# Patient Record
Sex: FEMALE | Race: WHITE | Employment: UNEMPLOYED | URBAN - METROPOLITAN AREA
[De-identification: names, ages, dates, MRNs, and addresses within clinical notes are randomized per-mention and may not be internally consistent; named-entity substitution may affect disease eponyms.]

---
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
## 2022-12-02 ENCOUNTER — HOSPITAL ENCOUNTER (INPATIENT)
Age: 22
LOS: 1 days | Discharge: HOME | End: 2022-12-03
Attending: OBSTETRICS & GYNECOLOGY | Admitting: OBSTETRICS & GYNECOLOGY
Payer: COMMERCIAL

## 2022-12-02 DIAGNOSIS — R11.10 HYPEREMESIS: Primary | ICD-10-CM

## 2022-12-02 LAB
ALBUMIN SERPL-MCNC: 3.4 G/DL (ref 3.5–5)
ALBUMIN/GLOB SERPL: 1 (ref 1.1–2.2)
ALP SERPL-CCNC: 80 U/L (ref 45–117)
ALT SERPL-CCNC: 24 U/L (ref 12–78)
ANION GAP SERPL CALC-SCNC: 11 MMOL/L (ref 5–15)
APPEARANCE UR: ABNORMAL
AST SERPL-CCNC: 16 U/L (ref 15–37)
BACTERIA URNS QL MICRO: ABNORMAL /HPF
BILIRUB SERPL-MCNC: 0.5 MG/DL (ref 0.2–1)
BILIRUB UR QL: NEGATIVE
BUN SERPL-MCNC: 7 MG/DL (ref 6–20)
BUN/CREAT SERPL: 13 (ref 12–20)
CALCIUM SERPL-MCNC: 9 MG/DL (ref 8.5–10.1)
CHLORIDE SERPL-SCNC: 105 MMOL/L (ref 97–108)
CO2 SERPL-SCNC: 22 MMOL/L (ref 21–32)
COLOR UR: ABNORMAL
CREAT SERPL-MCNC: 0.53 MG/DL (ref 0.55–1.02)
EPITH CASTS URNS QL MICRO: ABNORMAL /LPF
ERYTHROCYTE [DISTWIDTH] IN BLOOD BY AUTOMATED COUNT: 12.2 % (ref 11.5–14.5)
GLOBULIN SER CALC-MCNC: 3.5 G/DL (ref 2–4)
GLUCOSE SERPL-MCNC: 77 MG/DL (ref 65–100)
GLUCOSE UR STRIP.AUTO-MCNC: NEGATIVE MG/DL
HCT VFR BLD AUTO: 42.2 % (ref 35–47)
HGB BLD-MCNC: 14.3 G/DL (ref 11.5–16)
HGB UR QL STRIP: NEGATIVE
KETONES UR QL STRIP.AUTO: 80 MG/DL
LEUKOCYTE ESTERASE UR QL STRIP.AUTO: ABNORMAL
MCH RBC QN AUTO: 31.1 PG (ref 26–34)
MCHC RBC AUTO-ENTMCNC: 33.9 G/DL (ref 30–36.5)
MCV RBC AUTO: 91.7 FL (ref 80–99)
NITRITE UR QL STRIP.AUTO: NEGATIVE
NRBC # BLD: 0 K/UL (ref 0–0.01)
NRBC BLD-RTO: 0 PER 100 WBC
PH UR STRIP: 5.5 (ref 5–8)
PLATELET # BLD AUTO: 213 K/UL (ref 150–400)
PMV BLD AUTO: 9.2 FL (ref 8.9–12.9)
POTASSIUM SERPL-SCNC: 3.8 MMOL/L (ref 3.5–5.1)
PROT SERPL-MCNC: 6.9 G/DL (ref 6.4–8.2)
PROT UR STRIP-MCNC: ABNORMAL MG/DL
RBC # BLD AUTO: 4.6 M/UL (ref 3.8–5.2)
RBC #/AREA URNS HPF: ABNORMAL /HPF (ref 0–5)
SODIUM SERPL-SCNC: 138 MMOL/L (ref 136–145)
SP GR UR REFRACTOMETRY: 1.03
UA: UC IF INDICATED,UAUC: ABNORMAL
UROBILINOGEN UR QL STRIP.AUTO: 1 EU/DL (ref 0.2–1)
WBC # BLD AUTO: 9.8 K/UL (ref 3.6–11)
WBC URNS QL MICRO: ABNORMAL /HPF (ref 0–4)
YEAST URNS QL MICRO: PRESENT

## 2022-12-02 PROCEDURE — G0378 HOSPITAL OBSERVATION PER HR: HCPCS

## 2022-12-02 PROCEDURE — 96375 TX/PRO/DX INJ NEW DRUG ADDON: CPT

## 2022-12-02 PROCEDURE — 74011000250 HC RX REV CODE- 250: Performed by: OBSTETRICS & GYNECOLOGY

## 2022-12-02 PROCEDURE — 85027 COMPLETE CBC AUTOMATED: CPT

## 2022-12-02 PROCEDURE — 74011000258 HC RX REV CODE- 258: Performed by: OBSTETRICS & GYNECOLOGY

## 2022-12-02 PROCEDURE — 96365 THER/PROPH/DIAG IV INF INIT: CPT

## 2022-12-02 PROCEDURE — 80053 COMPREHEN METABOLIC PANEL: CPT

## 2022-12-02 PROCEDURE — 74011250636 HC RX REV CODE- 250/636: Performed by: OBSTETRICS & GYNECOLOGY

## 2022-12-02 PROCEDURE — 81001 URINALYSIS AUTO W/SCOPE: CPT

## 2022-12-02 PROCEDURE — 36415 COLL VENOUS BLD VENIPUNCTURE: CPT

## 2022-12-02 PROCEDURE — 96366 THER/PROPH/DIAG IV INF ADDON: CPT

## 2022-12-02 PROCEDURE — 65410000002 HC RM PRIVATE OB

## 2022-12-02 PROCEDURE — 87086 URINE CULTURE/COLONY COUNT: CPT

## 2022-12-02 RX ORDER — SODIUM CHLORIDE 0.9 % (FLUSH) 0.9 %
5-40 SYRINGE (ML) INJECTION AS NEEDED
Status: DISCONTINUED | OUTPATIENT
Start: 2022-12-02 | End: 2022-12-03 | Stop reason: HOSPADM

## 2022-12-02 RX ORDER — DOCUSATE SODIUM 100 MG/1
100 CAPSULE, LIQUID FILLED ORAL
Status: DISCONTINUED | OUTPATIENT
Start: 2022-12-02 | End: 2022-12-03 | Stop reason: HOSPADM

## 2022-12-02 RX ORDER — SODIUM CHLORIDE, SODIUM LACTATE, POTASSIUM CHLORIDE, CALCIUM CHLORIDE 600; 310; 30; 20 MG/100ML; MG/100ML; MG/100ML; MG/100ML
125 INJECTION, SOLUTION INTRAVENOUS CONTINUOUS
Status: DISCONTINUED | OUTPATIENT
Start: 2022-12-02 | End: 2022-12-03 | Stop reason: HOSPADM

## 2022-12-02 RX ORDER — FOLIC ACID/MULTIVIT,IRON,MINER 0.4MG-18MG
1 TABLET ORAL DAILY
Status: DISCONTINUED | OUTPATIENT
Start: 2022-12-03 | End: 2022-12-03 | Stop reason: HOSPADM

## 2022-12-02 RX ORDER — DIPHENHYDRAMINE HCL 25 MG
25 CAPSULE ORAL
Status: DISCONTINUED | OUTPATIENT
Start: 2022-12-02 | End: 2022-12-03 | Stop reason: HOSPADM

## 2022-12-02 RX ORDER — LEVOTHYROXINE SODIUM 100 UG/1
TABLET ORAL
COMMUNITY

## 2022-12-02 RX ORDER — ACETAMINOPHEN 325 MG/1
650 TABLET ORAL
Status: DISCONTINUED | OUTPATIENT
Start: 2022-12-02 | End: 2022-12-03 | Stop reason: HOSPADM

## 2022-12-02 RX ORDER — SODIUM CHLORIDE 0.9 % (FLUSH) 0.9 %
5-40 SYRINGE (ML) INJECTION EVERY 8 HOURS
Status: DISCONTINUED | OUTPATIENT
Start: 2022-12-02 | End: 2022-12-03 | Stop reason: HOSPADM

## 2022-12-02 RX ORDER — FAMOTIDINE 20 MG/1
20 TABLET, FILM COATED ORAL DAILY
Status: DISCONTINUED | OUTPATIENT
Start: 2022-12-03 | End: 2022-12-03 | Stop reason: HOSPADM

## 2022-12-02 RX ORDER — ZOLPIDEM TARTRATE 5 MG/1
5 TABLET ORAL
Status: DISCONTINUED | OUTPATIENT
Start: 2022-12-02 | End: 2022-12-03 | Stop reason: HOSPADM

## 2022-12-02 RX ORDER — ONDANSETRON 2 MG/ML
4 INJECTION INTRAMUSCULAR; INTRAVENOUS
Status: DISCONTINUED | OUTPATIENT
Start: 2022-12-02 | End: 2022-12-03 | Stop reason: HOSPADM

## 2022-12-02 RX ADMIN — ONDANSETRON 4 MG: 2 INJECTION INTRAMUSCULAR; INTRAVENOUS at 18:05

## 2022-12-02 RX ADMIN — THIAMINE HYDROCHLORIDE: 100 INJECTION, SOLUTION INTRAMUSCULAR; INTRAVENOUS at 18:19

## 2022-12-02 RX ADMIN — PROMETHAZINE HYDROCHLORIDE 25 MG: 25 INJECTION INTRAMUSCULAR; INTRAVENOUS at 18:20

## 2022-12-02 NOTE — PROGRESS NOTES
Problem: Nausea/Vomiting (Adult)  Goal: *Absence of nausea/vomiting  Outcome: Progressing Towards Goal  Goal: *Palliation of nausea/vomiting (Palliative Care)  Outcome: Progressing Towards Goal     Problem: Patient Education: Go to Patient Education Activity  Goal: Patient/Family Education  Outcome: Progressing Towards Goal

## 2022-12-02 NOTE — H&P
History & Physical    Name: Irais Hood MRN: [id number]  SSN: [id number]    YOB: 2000  Age: 20 y.o. Sex: female      Subjective:     Reason for Admission:  Pregnancy and Hyperemesis Gravidarum    History of Present Illness: Ms. Jina Goodrich is a 20 y.o.  female with an estimated gestational age of 11w0d with Estimated Date of Delivery: 23. Patient complains of moderate nausea/vomiting for 2 weeks. Pregnancy has been complicated by hyperemesis gravidarum. Pt has lost 6 lbs in the past 2 weeks per ob chart. Pt was seen in ED yesterday and discharged with antiemetics which the patient reports are not working. Pt reports 10 episodes of emesis today; she has dry heaves presently. Patient denies abdominal pain  , chest pain, fever, headache , pelvic pressure, right upper quadrant pain  , shortness of breath, swelling, vaginal bleeding , vaginal leaking of fluid , and visual disturbances. Pt does report a runny nose; no other URI symptoms. OB History    Para Term  AB Living   3 1 1   1     SAB IAB Ectopic Molar Multiple Live Births     1              # Outcome Date GA Lbr Jesus/2nd Weight Sex Delivery Anes PTL Lv   3 Current            2 Term 20    F Vag-Spont      1 IAB              Past Medical History:   Diagnosis Date    Abnormal Papanicolaou smear of cervix     Other ill-defined conditions(799.89)     Pt has had several episodes of postural hypotention    Thyroid activity decreased      Past Surgical History:   Procedure Laterality Date    HX OTHER SURGICAL       Social History     Occupational History    Not on file   Tobacco Use    Smoking status: Never    Smokeless tobacco: Never   Vaping Use    Vaping Use: Never used   Substance and Sexual Activity    Alcohol use: No    Drug use: No    Sexual activity: Never      No family history on file. No Known Allergies  Prior to Admission medications    Medication Sig Start Date End Date Taking?  Authorizing Provider levothyroxine (SYNTHROID) 100 mcg tablet Take  by mouth Daily (before breakfast). Yes Provider, Historical   PNV NU.43/SSLSBDS fum/folic ac (PRENATAL PO) Take  by mouth. Yes Provider, Historical   acetaminophen (TYLENOL) 325 mg tablet Take 2 Tabs by mouth every four (4) hours as needed for Pain. Patient not taking: Reported on 12/2/2022 1/12/18   Tameka Bryan PA-C   ondansetron [facility] ODT) 4 mg disintegrating tablet Take 1 Tab by mouth every eight (8) hours as needed for Nausea. Patient not taking: Reported on 12/2/2022 1/12/18   Tameka Bryan PA-C   butalbital-acetaminophen-caffeine [facility]) -40 mg per tablet Take 1-2 Tabs by mouth every four (4) hours as needed for Pain. Max Daily Amount: 12 Tabs. Patient not taking: Reported on 12/2/2022 11/16/15   Alesia Orona DO   drospirenone-ethinyl estradioL (KATE) 3-0.02 mg tab Take 1 Tab by mouth daily. Patient not taking: Reported on 12/2/2022    Other, MD Tate        Review of Systems:  A comprehensive review of systems was negative except for that written in the History of Present Illness. Objective:     Vitals:    Vitals:    12/02/22 1723   Weight: 97.1 kg (214 lb)   Height: 5' 5\" (1.651 m)      No data recorded. No data recorded     Physical Exam:  GEN: Alert Ox3; in no acute distress  CV:  NSR  ABD: soft nontender     Membranes:  Intact  Ext: no edema    Us performed in office today. Us confirms dates. Cardiac activity at 164 bpm.      Lab/Data Review:  No results found for this or any previous visit (from the past 24 hour(s)). Assessment and Plan: Active Problems:    * No active hospital problems.  *     - Hyperemesis Gravidarum:  Antiemetic Therapy including Phenergan/Zofran/Reglan/Zantac  Aggressive intravenous hydration  Check urine for ketones until trace results  Dietary consult  Strict Input and Output and Daily weights   IVF  Labs  IV antiemetics  Will see improved and able to restart a po regimen otherwise may consider zofran pump  All questions answered

## 2022-12-02 NOTE — PROGRESS NOTES
171:  Won Nicholas is a 20 y.o.   at 10w0d patient of Dr Josesito Weller at [facility] who presents to antepartum with c/o nausea vomiting. She denies vaginal bleeding, LOF, and contractions. She also denies . Urine sample obtained. : Bedside shift change report given to ESTRELLITA Herman RN (oncoming nurse) by KRYSTYNA Fish RN (offgoing nurse). Report included the following information SBAR and Kardex.

## 2022-12-03 VITALS
OXYGEN SATURATION: 95 % | TEMPERATURE: 98.5 F | WEIGHT: 214 LBS | RESPIRATION RATE: 16 BRPM | HEART RATE: 74 BPM | SYSTOLIC BLOOD PRESSURE: 93 MMHG | BODY MASS INDEX: 35.65 KG/M2 | HEIGHT: 65 IN | DIASTOLIC BLOOD PRESSURE: 51 MMHG

## 2022-12-03 LAB
BACTERIA SPEC CULT: NORMAL
CC UR VC: NORMAL
SERVICE CMNT-IMP: NORMAL

## 2022-12-03 PROCEDURE — 74011250637 HC RX REV CODE- 250/637: Performed by: OBSTETRICS & GYNECOLOGY

## 2022-12-03 PROCEDURE — 96366 THER/PROPH/DIAG IV INF ADDON: CPT

## 2022-12-03 PROCEDURE — G0378 HOSPITAL OBSERVATION PER HR: HCPCS

## 2022-12-03 PROCEDURE — 74011250636 HC RX REV CODE- 250/636: Performed by: OBSTETRICS & GYNECOLOGY

## 2022-12-03 RX ORDER — PROMETHAZINE HYDROCHLORIDE 25 MG/1
25 TABLET ORAL
Status: DISCONTINUED | OUTPATIENT
Start: 2022-12-03 | End: 2022-12-03 | Stop reason: HOSPADM

## 2022-12-03 RX ORDER — ONDANSETRON 8 MG/1
8 TABLET, ORALLY DISINTEGRATING ORAL EVERY 6 HOURS
Qty: 30 TABLET | Refills: 1 | Status: SHIPPED | OUTPATIENT
Start: 2022-12-03

## 2022-12-03 RX ORDER — PROMETHAZINE HYDROCHLORIDE 25 MG/1
25 TABLET ORAL
Qty: 20 TABLET | Refills: 1 | Status: SHIPPED | OUTPATIENT
Start: 2022-12-03

## 2022-12-03 RX ORDER — FACIAL-BODY WIPES
10 EACH TOPICAL DAILY PRN
Status: DISCONTINUED | OUTPATIENT
Start: 2022-12-03 | End: 2022-12-03 | Stop reason: HOSPADM

## 2022-12-03 RX ORDER — ONDANSETRON 4 MG/1
8 TABLET, ORALLY DISINTEGRATING ORAL
Status: DISCONTINUED | OUTPATIENT
Start: 2022-12-03 | End: 2022-12-03

## 2022-12-03 RX ORDER — ONDANSETRON 4 MG/1
8 TABLET, ORALLY DISINTEGRATING ORAL EVERY 6 HOURS
Status: DISCONTINUED | OUTPATIENT
Start: 2022-12-03 | End: 2022-12-03 | Stop reason: HOSPADM

## 2022-12-03 RX ORDER — FACIAL-BODY WIPES
10 EACH TOPICAL
Qty: 20 SUPPOSITORY | Refills: 1 | Status: SHIPPED | OUTPATIENT
Start: 2022-12-03 | End: 2022-12-16

## 2022-12-03 RX ADMIN — SODIUM CHLORIDE, POTASSIUM CHLORIDE, SODIUM LACTATE AND CALCIUM CHLORIDE 125 ML/HR: 600; 310; 30; 20 INJECTION, SOLUTION INTRAVENOUS at 07:38

## 2022-12-03 RX ADMIN — FAMOTIDINE 20 MG: 20 TABLET, FILM COATED ORAL at 09:43

## 2022-12-03 RX ADMIN — BISACODYL 10 MG: 10 SUPPOSITORY RECTAL at 09:59

## 2022-12-03 RX ADMIN — Medication 1 TABLET: at 09:43

## 2022-12-03 RX ADMIN — ONDANSETRON 8 MG: 4 TABLET, ORALLY DISINTEGRATING ORAL at 16:28

## 2022-12-03 RX ADMIN — ONDANSETRON 8 MG: 4 TABLET, ORALLY DISINTEGRATING ORAL at 09:59

## 2022-12-03 NOTE — PROGRESS NOTES
----- Message from Gali Nguyen sent at 1/25/2017  8:21 AM CST -----  Patient needs a printout of all of his medical records for 2016.  Callback:  [phone] Please just mail them to him. The address on his record his correct.   Pt discharged home after nursing reports desire for discharge and toleration of regular diet and controlled on po medication. Follow up in one week.

## 2022-12-03 NOTE — PROGRESS NOTES
Comprehensive Nutrition Assessment    Type and Reason for Visit: Initial, Consult    Nutrition Recommendations/Plan:   Continue regular diet. RD ordered Ensure Clear po BID with meals. Please document % meals and supplements consumed in flowsheet I/O's under intake. Malnutrition Assessment:  Malnutrition Status:  Insufficient data (22 1021)      Nutrition Assessment:    12/3: Chart reviewed; med noted fir hyperemesis gravidarum. Pt's n/v has improved and diet has progressed to a regular as of this AM from clear liquids. At time of attempt to visit, pt in restroom and significant other present. Pt is taking a prenatal. Lytes and BG stable. No data found. Last Weight Metric  Weight Loss Metrics 2022 2018 2015 2015 2013 10/11/2011 2011   Today's Wt 214 lb 169 lb 8.5 oz 125 lb 7.1 oz 132 lb 4.4 oz 122 lb 9.6 oz 104 lb 15 oz 91 lb 11.4 oz   BMI 35.61 kg/m2 29.1 kg/m2 - - 21.03 kg/m2 18.59 kg/m2 -      Nutrition Related Findings:    Labs: stable; Meds: pre-, zofran Wound Type: None    Current Nutrition Intake & Therapies:        ADULT DIET Regular  ADULT ORAL NUTRITION SUPPLEMENT Breakfast, Dinner; Clear Liquid    Anthropometric Measures:  Height: 5' 5\" (165.1 cm)  Ideal Body Weight (IBW): 125 lbs (57 kg)     Current Body Wt:  97.1 kg (214 lb 1.1 oz), 171.3 % IBW. Current BMI (kg/m2): 35.6                          BMI Category: Obese class 2 (BMI 35.0-39. 9)    Estimated Daily Nutrient Needs:  Energy Requirements Based On: Formula  Weight Used for Energy Requirements: Current  Energy (kcal/day): 2200 (BMR x 1. 3AF)  Weight Used for Protein Requirements: Current  Protein (g/day): 97 (1.0 g/kg bw)  Method Used for Fluid Requirements: 1 ml/kcal  Fluid (ml/day): 2200 ml/day    Nutrition Diagnosis:   Inadequate protein-energy intake related to  (hyperemesis) as evidenced by  (recent weight loss and poor po)    Nutrition Interventions:   Food and/or Nutrient Delivery: Continue current diet, Start oral nutrition supplement  Nutrition Education/Counseling: No recommendations at this time  Coordination of Nutrition Care: Continue to monitor while inpatient       Goals:     Goals: PO intake 50% or greater, by next RD assessment       Nutrition Monitoring and Evaluation:   Behavioral-Environmental Outcomes: None identified  Food/Nutrient Intake Outcomes: Food and nutrient intake, Supplement intake  Physical Signs/Symptoms Outcomes: Biochemical data, Weight, Skin    Discharge Planning:    Continue current diet    Cuca Arguello, RD  Contact:

## 2022-12-03 NOTE — DISCHARGE SUMMARY
Antepartum Discharge Summary     Name: Wali Bergeron MRN: [id number]  SSN: [id number]    YOB: 2000  Age: 20 y.o. Sex: female      Allergies: Patient has no known allergies. Admit Date: 12/2/2022    Discharge Date: 12/3/2022     Admitting Physician: Valdez Arthur MD     Attending Physician:  Nancy Bentley MD     * Admission Diagnoses: Hyperemesis affecting pregnancy, antepartum [O21.0]    * Discharge Diagnoses:  No results found for: [address], RUBELLAEXT, GRBSEXT, ABORHEXT, RUBELLAEXT, GRBSEXT   There is no immunization history on file for this patient. * Discharge Condition: improved    * Procedures:  none  * No surgery found [facility] Course:    - Hyperemesis Gravidarum:                              - Patient admitted with nausea; improved past aggressive hydration and antiemetic therapy. - Continue medications listed below. [facility] diet. * Disposition: Home    Discharge Medications:   Current Discharge Medication List        START taking these medications    Details   promethazine (PHENERGAN) 25 mg tablet Take 1 Tablet by mouth every six (6) hours as needed for Nausea (nausea). Qty: 20 Tablet, Refills: 1  Start date: 12/3/2022    Associated Diagnoses: Hyperemesis      bisacodyL (DULCOLAX) 10 mg supp Insert 10 mg into rectum daily as needed for Constipation. Qty: 20 Suppository, Refills: 1  Start date: 12/3/2022    Associated Diagnoses: Hyperemesis           CONTINUE these medications which have CHANGED    Details   ondansetron (ZOFRAN ODT) 8 mg disintegrating tablet Take 1 Tablet by mouth every six (6) hours. Indications: excessive vomiting in pregnancy  Qty: 30 Tablet, Refills: 1  Start date: 12/3/2022    Associated Diagnoses: Hyperemesis           CONTINUE these medications which have NOT CHANGED    Details   levothyroxine (SYNTHROID) 100 mcg tablet Take  by mouth Daily (before breakfast). PNV CS.05/EKLBEMG fum/folic ac (PRENATAL PO) Take  by mouth. butalbital-acetaminophen-caffeine (FIORICET) -40 mg per tablet Take 1-2 Tabs by mouth every four (4) hours as needed for Pain. Max Daily Amount: 12 Tabs. Qty: 20 Tab, Refills: 0      drospirenone-ethinyl estradioL (KATE) 3-0.02 mg tab Take 1 Tab by mouth daily. * Follow-up Care/Patient Instructions:   Activity: Activity as tolerated  Diet: Regular diet (bland)  Wound Care: None needed    Follow-up Information       Follow up With Specialties Details Why Contact Info    None    None (395) Patient stated that they have no PCP      [address]  Follow up in 1 week(s) or, As needed, If symptoms worsen [place]  [phone]

## 2022-12-03 NOTE — ROUTINE PROCESS
1430 patient feeling better, would like to go home today if possible, tolerating PO, had bowel movement within 30 minutes of ducolax, will plan to give next dose of zofran at 1600 and monitor    1530 call placed to Dr. Jack Green to update on patient status, will place discharge orders in computer    1630 Patient discharge prescriptions & instructions given. Verbalized understanding. All questions answered. No distress noted. Signed copy of discharge instructions on paper chart.

## 2022-12-03 NOTE — PROGRESS NOTES
1915 Bedside shift change report given to ESTRELLITA Varghese RN (oncoming nurse) by KRYSTYNA Olvera RN (offgoing nurse). Report included the following information SBAR and Kardex. 3275 Bedside shift change report given to YAMILETH Ambrocio RN (oncoming nurse) by ESTRELLITA Varghese RN (offgoing nurse). Report included the following information SBAR and Kardex.

## 2022-12-03 NOTE — PROGRESS NOTES
Ante Partum High Risk Pregnancy Note    Patient admitted for hyperemesis gravidarum states she does not  have  headache , abdominal pain  , contractions, right upper quadrant pain  , vaginal bleeding , swelling, and vaginal leaking of fluid . LOS:  1    Vitals: Temp (24hrs), Av.3 °F (36.8 °C), Min:98 °F (36.7 °C), Max:98.5 °F (36.9 °C)   Patient Vitals for the past 24 hrs:   BP   22 0735 (!) 93/51   22 0013 (!) 97/54   22 1949 106/67   22 1728 129/77       I&O:   No intake/output data recorded.  1901 -  0700  In: 1014.6 [I.V.:1014.6]  Out: 300 [Urine:300]    Exam:  Patient without distress.                Abdomen: soft, non-tender               Fundus: soft and non tender                           Right Upper Quadrant: non-tender               Perineum: No sign of blood or amniotic fluid               Lower Extremities: No               Patellar Reflexes: absent               Clonus: absent                        Lab/Data Review:  BMP:   Lab Results   Component Value Date/Time     2022 06:08 PM    K 3.8 2022 06:08 PM     2022 06:08 PM    CO2 22 2022 06:08 PM    AGAP 11 2022 06:08 PM    GLU 77 2022 06:08 PM    BUN 7 2022 06:08 PM    CREA 0.53 (L) 2022 06:08 PM     CMP:   Lab Results   Component Value Date/Time     2022 06:08 PM    K 3.8 2022 06:08 PM     2022 06:08 PM    CO2 22 2022 06:08 PM    AGAP 11 2022 06:08 PM    GLU 77 2022 06:08 PM    BUN 7 2022 06:08 PM    CREA 0.53 (L) 2022 06:08 PM    CA 9.0 2022 06:08 PM    ALB 3.4 (L) 2022 06:08 PM    TP 6.9 2022 06:08 PM    GLOB 3.5 2022 06:08 PM    AGRAT 1.0 (L) 2022 06:08 PM    ALT 24 2022 06:08 PM     CBC:   Lab Results   Component Value Date/Time    WBC 9.8 2022 06:08 PM    HGB 14.3 2022 06:08 PM    HCT 42.2 2022 06:08 PM     2022 06:08 PM       Assessment and Plan: Active Problems:    * No active hospital problems. *        Hyperemesis Gravidarum:  Antiemetic Therapy including Phenergan/Zofran/Reglan/Zantac  Aggressive intravenous hydration  Check urine for ketones until trace results  Dietary consult  Strict Input and Output and Daily weights     Pt reports zero episodes of emesis since admission  She reports feels nauseated this morning; level of nausea presently 1/10  Will attempt to advance diet as improved  Will attempt to convert to a po regimen.   All questions answered

## 2022-12-07 ENCOUNTER — HOSPITAL ENCOUNTER (EMERGENCY)
Age: 22
Discharge: HOME | End: 2022-12-07
Attending: MIDWIFE | Admitting: MIDWIFE
Payer: COMMERCIAL

## 2022-12-07 VITALS
HEART RATE: 92 BPM | RESPIRATION RATE: 18 BRPM | OXYGEN SATURATION: 96 % | TEMPERATURE: 98.4 F | SYSTOLIC BLOOD PRESSURE: 112 MMHG | DIASTOLIC BLOOD PRESSURE: 74 MMHG | HEIGHT: 65 IN | BODY MASS INDEX: 35.16 KG/M2 | WEIGHT: 211 LBS

## 2022-12-07 LAB
ALBUMIN SERPL-MCNC: 3.5 G/DL (ref 3.5–5)
ALBUMIN/GLOB SERPL: 0.9 {RATIO} (ref 1.1–2.2)
ALP SERPL-CCNC: 83 U/L (ref 45–117)
ALT SERPL-CCNC: 28 U/L (ref 12–78)
ANION GAP SERPL CALC-SCNC: 11 MMOL/L (ref 5–15)
AST SERPL-CCNC: 13 U/L (ref 15–37)
BASOPHILS # BLD: 0 K/UL (ref 0–0.1)
BASOPHILS NFR BLD: 0 % (ref 0–1)
BILIRUB SERPL-MCNC: 0.5 MG/DL (ref 0.2–1)
BUN SERPL-MCNC: 8 MG/DL (ref 6–20)
BUN/CREAT SERPL: 13 (ref 12–20)
CALCIUM SERPL-MCNC: 9.2 MG/DL (ref 8.5–10.1)
CHLORIDE SERPL-SCNC: 106 MMOL/L (ref 97–108)
CO2 SERPL-SCNC: 21 MMOL/L (ref 21–32)
CREAT SERPL-MCNC: 0.63 MG/DL (ref 0.55–1.02)
DIFFERENTIAL METHOD BLD: ABNORMAL
EOSINOPHIL # BLD: 0.1 K/UL (ref 0–0.4)
EOSINOPHIL NFR BLD: 1 % (ref 0–7)
ERYTHROCYTE [DISTWIDTH] IN BLOOD BY AUTOMATED COUNT: 12 % (ref 11.5–14.5)
GLOBULIN SER CALC-MCNC: 3.7 G/DL (ref 2–4)
GLUCOSE SERPL-MCNC: 146 MG/DL (ref 65–100)
HCT VFR BLD AUTO: 41.1 % (ref 35–47)
HGB BLD-MCNC: 14.3 G/DL (ref 11.5–16)
IMM GRANULOCYTES # BLD AUTO: 0.1 K/UL (ref 0–0.04)
IMM GRANULOCYTES NFR BLD AUTO: 1 % (ref 0–0.5)
LYMPHOCYTES # BLD: 2.2 K/UL (ref 0.8–3.5)
LYMPHOCYTES NFR BLD: 19 % (ref 12–49)
MCH RBC QN AUTO: 31.2 PG (ref 26–34)
MCHC RBC AUTO-ENTMCNC: 34.8 G/DL (ref 30–36.5)
MCV RBC AUTO: 89.5 FL (ref 80–99)
MONOCYTES # BLD: 0.7 K/UL (ref 0–1)
MONOCYTES NFR BLD: 6 % (ref 5–13)
NEUTS SEG # BLD: 8.9 K/UL (ref 1.8–8)
NEUTS SEG NFR BLD: 73 % (ref 32–75)
NRBC # BLD: 0 K/UL (ref 0–0.01)
NRBC BLD-RTO: 0 PER 100 WBC
PLATELET # BLD AUTO: 295 K/UL (ref 150–400)
PMV BLD AUTO: 9 FL (ref 8.9–12.9)
POTASSIUM SERPL-SCNC: 3.4 MMOL/L (ref 3.5–5.1)
PROT SERPL-MCNC: 7.2 G/DL (ref 6.4–8.2)
RBC # BLD AUTO: 4.59 M/UL (ref 3.8–5.2)
SODIUM SERPL-SCNC: 138 MMOL/L (ref 136–145)
WBC # BLD AUTO: 12 K/UL (ref 3.6–11)

## 2022-12-07 PROCEDURE — 85025 COMPLETE CBC W/AUTO DIFF WBC: CPT

## 2022-12-07 PROCEDURE — 74011250636 HC RX REV CODE- 250/636: Performed by: OBSTETRICS & GYNECOLOGY

## 2022-12-07 PROCEDURE — 74011000258 HC RX REV CODE- 258: Performed by: OBSTETRICS & GYNECOLOGY

## 2022-12-07 PROCEDURE — 36415 COLL VENOUS BLD VENIPUNCTURE: CPT

## 2022-12-07 PROCEDURE — 74011250637 HC RX REV CODE- 250/637: Performed by: MIDWIFE

## 2022-12-07 PROCEDURE — 80053 COMPREHEN METABOLIC PANEL: CPT

## 2022-12-07 RX ORDER — ONDANSETRON 2 MG/ML
4 INJECTION INTRAMUSCULAR; INTRAVENOUS
Status: DISCONTINUED | OUTPATIENT
Start: 2022-12-07 | End: 2022-12-07 | Stop reason: HOSPADM

## 2022-12-07 RX ORDER — SCOLOPAMINE TRANSDERMAL SYSTEM 1 MG/1
1 PATCH, EXTENDED RELEASE TRANSDERMAL
Qty: 5 PATCH | Refills: 0 | Status: SHIPPED | OUTPATIENT
Start: 2022-12-07

## 2022-12-07 RX ORDER — SCOLOPAMINE TRANSDERMAL SYSTEM 1 MG/1
1 PATCH, EXTENDED RELEASE TRANSDERMAL
Status: DISCONTINUED | OUTPATIENT
Start: 2022-12-07 | End: 2022-12-07 | Stop reason: HOSPADM

## 2022-12-07 RX ORDER — DEXTROSE, SODIUM CHLORIDE, SODIUM LACTATE, POTASSIUM CHLORIDE, AND CALCIUM CHLORIDE 5; .6; .31; .03; .02 G/100ML; G/100ML; G/100ML; G/100ML; G/100ML
1000 INJECTION, SOLUTION INTRAVENOUS ONCE
Status: COMPLETED | OUTPATIENT
Start: 2022-12-07 | End: 2022-12-07

## 2022-12-07 RX ADMIN — SODIUM CHLORIDE, SODIUM LACTATE, POTASSIUM CHLORIDE, CALCIUM CHLORIDE AND DEXTROSE MONOHYDRATE 1000 ML: 5; 600; 310; 30; 20 INJECTION, SOLUTION INTRAVENOUS at 18:20

## 2022-12-07 RX ADMIN — ONDANSETRON 4 MG: 2 INJECTION INTRAMUSCULAR; INTRAVENOUS at 18:20

## 2022-12-07 RX ADMIN — PROMETHAZINE HYDROCHLORIDE 25 MG: 25 INJECTION INTRAMUSCULAR; INTRAVENOUS at 18:51

## 2022-12-07 NOTE — PROGRESS NOTES
175: Shanda Mendez is a 20 y.o.   at 10w5d patient of Dr Abdulkadir Bro at [facility] who presents to L&D triage with c/o nausea and vomiting. She reports Positive FM, denies vaginal bleeding, LOF, and contractions. She also denies Headaches, Scotoma, RUQ pain, and Edema. Verbal orders received from ESTRELLITA Kulkarni for IV meds, fluids and lab work to be completed. : Bedside shift change report given to [facility] (oncoming nurse) by ALTON Green RN (offgoing nurse). Report included the following information SBAR, Kardex, and MAR.

## 2022-12-08 ENCOUNTER — HOSPITAL ENCOUNTER (OUTPATIENT)
Age: 22
Discharge: HOME | End: 2022-12-08
Attending: MIDWIFE | Admitting: MIDWIFE
Payer: COMMERCIAL

## 2022-12-08 VITALS
TEMPERATURE: 98.8 F | OXYGEN SATURATION: 98 % | WEIGHT: 211 LBS | DIASTOLIC BLOOD PRESSURE: 60 MMHG | HEIGHT: 65 IN | BODY MASS INDEX: 35.16 KG/M2 | HEART RATE: 78 BPM | SYSTOLIC BLOOD PRESSURE: 109 MMHG | RESPIRATION RATE: 16 BRPM

## 2022-12-08 PROBLEM — O21.9 NAUSEA AND VOMITING IN PREGNANCY: Status: ACTIVE | Noted: 2022-12-08

## 2022-12-08 LAB
ALBUMIN SERPL-MCNC: 3.4 G/DL (ref 3.5–5)
ALBUMIN/GLOB SERPL: 0.9 {RATIO} (ref 1.1–2.2)
ALP SERPL-CCNC: 77 U/L (ref 45–117)
ALT SERPL-CCNC: 23 U/L (ref 12–78)
ANION GAP SERPL CALC-SCNC: 5 MMOL/L (ref 5–15)
APPEARANCE UR: ABNORMAL
AST SERPL-CCNC: 8 U/L (ref 15–37)
BACTERIA URNS QL MICRO: ABNORMAL /HPF
BASOPHILS # BLD: 0 K/UL (ref 0–0.1)
BASOPHILS NFR BLD: 0 % (ref 0–1)
BILIRUB SERPL-MCNC: 0.8 MG/DL (ref 0.2–1)
BILIRUB UR QL CFM: NEGATIVE
BUN SERPL-MCNC: 6 MG/DL (ref 6–20)
BUN/CREAT SERPL: 9 (ref 12–20)
CALCIUM SERPL-MCNC: 9.1 MG/DL (ref 8.5–10.1)
CHLORIDE SERPL-SCNC: 109 MMOL/L (ref 97–108)
CO2 SERPL-SCNC: 25 MMOL/L (ref 21–32)
COLOR UR: ABNORMAL
CREAT SERPL-MCNC: 0.64 MG/DL (ref 0.55–1.02)
DIFFERENTIAL METHOD BLD: ABNORMAL
EOSINOPHIL # BLD: 0.1 K/UL (ref 0–0.4)
EOSINOPHIL NFR BLD: 1 % (ref 0–7)
EPITH CASTS URNS QL MICRO: ABNORMAL /LPF
ERYTHROCYTE [DISTWIDTH] IN BLOOD BY AUTOMATED COUNT: 12.2 % (ref 11.5–14.5)
GLOBULIN SER CALC-MCNC: 3.6 G/DL (ref 2–4)
GLUCOSE SERPL-MCNC: 107 MG/DL (ref 65–100)
GLUCOSE UR STRIP.AUTO-MCNC: NEGATIVE MG/DL
HCT VFR BLD AUTO: 39.4 % (ref 35–47)
HGB BLD-MCNC: 13.5 G/DL (ref 11.5–16)
HGB UR QL STRIP: ABNORMAL
IMM GRANULOCYTES # BLD AUTO: 0.1 K/UL (ref 0–0.04)
IMM GRANULOCYTES NFR BLD AUTO: 0 % (ref 0–0.5)
KETONES UR QL STRIP.AUTO: 15 MG/DL
LEUKOCYTE ESTERASE UR QL STRIP.AUTO: ABNORMAL
LYMPHOCYTES # BLD: 2.7 K/UL (ref 0.8–3.5)
LYMPHOCYTES NFR BLD: 23 % (ref 12–49)
MCH RBC QN AUTO: 31.1 PG (ref 26–34)
MCHC RBC AUTO-ENTMCNC: 34.3 G/DL (ref 30–36.5)
MCV RBC AUTO: 90.8 FL (ref 80–99)
MONOCYTES # BLD: 0.8 K/UL (ref 0–1)
MONOCYTES NFR BLD: 7 % (ref 5–13)
MUCOUS THREADS URNS QL MICRO: ABNORMAL /LPF
NEUTS SEG # BLD: 8.2 K/UL (ref 1.8–8)
NEUTS SEG NFR BLD: 69 % (ref 32–75)
NITRITE UR QL STRIP.AUTO: NEGATIVE
NRBC # BLD: 0 K/UL (ref 0–0.01)
NRBC BLD-RTO: 0 PER 100 WBC
PH UR STRIP: 5.5 [PH] (ref 5–8)
PLATELET # BLD AUTO: 283 K/UL (ref 150–400)
PMV BLD AUTO: 9 FL (ref 8.9–12.9)
POTASSIUM SERPL-SCNC: 3.5 MMOL/L (ref 3.5–5.1)
PROT SERPL-MCNC: 7 G/DL (ref 6.4–8.2)
PROT UR STRIP-MCNC: 30 MG/DL
RBC # BLD AUTO: 4.34 M/UL (ref 3.8–5.2)
RBC #/AREA URNS HPF: ABNORMAL /HPF (ref 0–5)
SODIUM SERPL-SCNC: 139 MMOL/L (ref 136–145)
SP GR UR REFRACTOMETRY: 1.03
UA: UC IF INDICATED,UAUC: ABNORMAL
UROBILINOGEN UR QL STRIP.AUTO: 1 EU/DL (ref 0.2–1)
WBC # BLD AUTO: 11.9 K/UL (ref 3.6–11)
WBC URNS QL MICRO: ABNORMAL /HPF (ref 0–4)

## 2022-12-08 PROCEDURE — 85025 COMPLETE CBC W/AUTO DIFF WBC: CPT

## 2022-12-08 PROCEDURE — 99282 EMERGENCY DEPT VISIT SF MDM: CPT

## 2022-12-08 PROCEDURE — 74011250636 HC RX REV CODE- 250/636: Performed by: MIDWIFE

## 2022-12-08 PROCEDURE — 87086 URINE CULTURE/COLONY COUNT: CPT

## 2022-12-08 PROCEDURE — 74011250637 HC RX REV CODE- 250/637: Performed by: MIDWIFE

## 2022-12-08 PROCEDURE — 80053 COMPREHEN METABOLIC PANEL: CPT

## 2022-12-08 PROCEDURE — 74011000250 HC RX REV CODE- 250: Performed by: MIDWIFE

## 2022-12-08 PROCEDURE — 36415 COLL VENOUS BLD VENIPUNCTURE: CPT

## 2022-12-08 PROCEDURE — 96360 HYDRATION IV INFUSION INIT: CPT

## 2022-12-08 PROCEDURE — 81001 URINALYSIS AUTO W/SCOPE: CPT

## 2022-12-08 RX ORDER — ONDANSETRON 2 MG/ML
4 INJECTION INTRAMUSCULAR; INTRAVENOUS
Status: DISCONTINUED | OUTPATIENT
Start: 2022-12-08 | End: 2022-12-08 | Stop reason: HOSPADM

## 2022-12-08 RX ORDER — GRANULES FOR ORAL 3 G/1
3 POWDER ORAL ONCE
Status: COMPLETED | OUTPATIENT
Start: 2022-12-08 | End: 2022-12-08

## 2022-12-08 RX ADMIN — THIAMINE HYDROCHLORIDE: 100 INJECTION, SOLUTION INTRAMUSCULAR; INTRAVENOUS at 16:47

## 2022-12-08 RX ADMIN — GRANULES FOR ORAL SOLUTION 3 G: 3 POWDER ORAL at 18:13

## 2022-12-08 RX ADMIN — ONDANSETRON 4 MG: 2 INJECTION INTRAMUSCULAR; INTRAVENOUS at 17:41

## 2022-12-08 NOTE — H&P
History & Physical    Name: Sotero Angelucci MRN: [id number]  SSN: [id number]    YOB: 2000  Age: 20 y.o. Sex: female      Subjective:     Reason for Admission:  Pregnancy and Hyperemesis Gravidarum    History of Present Illness: Ms. Yolanda Monroe is a 20 y.o.  female with an estimated gestational age of 7w10d with Estimated Date of Delivery: 23. Patient complains of severe nausea/vomiting for 3 days. Pregnancy has been complicated by nausea and vomiting. Patient denies abdominal pain  , chest pain, contractions, fever, headache , pelvic pressure, right upper quadrant pain  , shortness of breath, swelling, vaginal bleeding , vaginal leaking of fluid , and visual disturbances. OB History    Para Term  AB Living   3 1 1   1 1   SAB IAB Ectopic Molar Multiple Live Births     1       1      # Outcome Date GA Lbr Jesus/2nd Weight Sex Delivery Anes PTL Lv   3 Current            2 Term 20    F Vag-Spont EPI  PING   1 IAB              Past Medical History:   Diagnosis Date    Abnormal Papanicolaou smear of cervix     Other ill-defined conditions(799.89)     Pt has had several episodes of postural hypotention    POTS (postural orthostatic tachycardia syndrome)     Thyroid activity decreased      Past Surgical History:   Procedure Laterality Date    HX OTHER SURGICAL       Social History     Occupational History    Not on file   Tobacco Use    Smoking status: Never    Smokeless tobacco: Never   Vaping Use    Vaping Use: Never used   Substance and Sexual Activity    Alcohol use: No    Drug use: No    Sexual activity: Never      History reviewed. No pertinent family history. No Known Allergies  Prior to Admission medications    Medication Sig Start Date End Date Taking? Authorizing Provider   scopolamine (TRANSDERM-SCOP) 1 mg over 3 days pt3d 1 Patch by TransDERmal route every seventy-two (72) hours.  22  Yes Denis Ip, CNM   ondansetron (ZOFRAN ODT) 8 mg disintegrating tablet Take 1 Tablet by mouth every six (6) hours. Indications: excessive vomiting in pregnancy  Patient taking differently: Take 4 mg by mouth every four (4) hours. Indications: excessive vomiting in pregnancy 12/3/22  Yes Omari Moraes MD   promethazine (PHENERGAN) 25 mg tablet Take 1 Tablet by mouth every six (6) hours as needed for Nausea (nausea). 12/3/22  Yes Omari Moraes MD   bisacodyL (DULCOLAX) 10 mg supp Insert 10 mg into rectum daily as needed for Constipation. 12/3/22  Yes Omari Moraes MD   levothyroxine (SYNTHROID) 100 mcg tablet Take  by mouth Daily (before breakfast). Yes Provider, Historical   PNV TZ.78/UFZQJXK fum/folic ac (PRENATAL PO) Take  by mouth. Yes Provider, Historical   acetaminophen (TYLENOL) 325 mg tablet Take 2 Tabs by mouth every four (4) hours as needed for Pain. Patient not taking: No sig reported 18   Alberto Monet PA-C   butalbital-acetaminophen-caffeine (FIORICET) -40 mg per tablet Take 1-2 Tabs by mouth every four (4) hours as needed for Pain. Max Daily Amount: 12 Tabs. Patient not taking: Reported on 2022 11/16/15   Jewels Matias DO   drospirenone-ethinyl estradioL (KATE) 3-0.02 mg tab Take 1 Tab by mouth daily. Patient not taking: Reported on 2022    Other, MD Tate        Review of Systems:  A comprehensive review of systems was negative except for that written in the History of Present Illness.      Objective:     Vitals:    Vitals:    22 1820 22 1856   BP: 112/74    Pulse: 92    Resp: 18    Temp: 98.4 °F (36.9 °C)    SpO2: 96%    Weight:  95.7 kg (211 lb)   Height:  5' 5\" (1.651 m)      Temp (24hrs), Av.4 °F (36.9 °C), Min:98.4 °F (36.9 °C), Max:98.4 °F (36.9 °C)    BP  Min: 112/74  Max: 112/74     Physical Exam:  Deferred     Membranes:  Intact  Uterine Activity:  None  Fetal Heart Rate:  unable to determine d/t gestational age       Lab/Data Review:  Recent Results (from the past 24 hour(s))   CBC WITH AUTOMATED DIFF    Collection Time: 12/07/22  6:23 PM   Result Value Ref Range    WBC 12.0 (H) 3.6 - 11.0 K/uL    RBC 4.59 3.80 - 5.20 M/uL    HGB 14.3 11.5 - 16.0 g/dL    HCT 41.1 35.0 - 47.0 %    MCV 89.5 80.0 - 99.0 FL    MCH 31.2 26.0 - 34.0 PG    MCHC 34.8 30.0 - 36.5 g/dL    RDW 12.0 11.5 - 14.5 %    PLATELET 162 690 - 362 K/uL    MPV 9.0 8.9 - 12.9 FL    NRBC 0.0 0  WBC    ABSOLUTE NRBC 0.00 0.00 - 0.01 K/uL    NEUTROPHILS 73 32 - 75 %    LYMPHOCYTES 19 12 - 49 %    MONOCYTES 6 5 - 13 %    EOSINOPHILS 1 0 - 7 %    BASOPHILS 0 0 - 1 %    IMMATURE GRANULOCYTES 1 (H) 0.0 - 0.5 %    ABS. NEUTROPHILS 8.9 (H) 1.8 - 8.0 K/UL    ABS. LYMPHOCYTES 2.2 0.8 - 3.5 K/UL    ABS. MONOCYTES 0.7 0.0 - 1.0 K/UL    ABS. EOSINOPHILS 0.1 0.0 - 0.4 K/UL    ABS. BASOPHILS 0.0 0.0 - 0.1 K/UL    ABS. IMM. GRANS. 0.1 (H) 0.00 - 0.04 K/UL    DF AUTOMATED     METABOLIC PANEL, COMPREHENSIVE    Collection Time: 12/07/22  6:23 PM   Result Value Ref Range    Sodium 138 136 - 145 mmol/L    Potassium 3.4 (L) 3.5 - 5.1 mmol/L    Chloride 106 97 - 108 mmol/L    CO2 21 21 - 32 mmol/L    Anion gap 11 5 - 15 mmol/L    Glucose 146 (H) 65 - 100 mg/dL    BUN 8 6 - 20 MG/DL    Creatinine 0.63 0.55 - 1.02 MG/DL    BUN/Creatinine ratio 13 12 - 20      eGFR >60 >60 ml/min/1.73m2    Calcium 9.2 8.5 - 10.1 MG/DL    Bilirubin, total 0.5 0.2 - 1.0 MG/DL    ALT (SGPT) 28 12 - 78 U/L    AST (SGOT) 13 (L) 15 - 37 U/L    Alk. phosphatase 83 45 - 117 U/L    Protein, total 7.2 6.4 - 8.2 g/dL    Albumin 3.5 3.5 - 5.0 g/dL    Globulin 3.7 2.0 - 4.0 g/dL    A-G Ratio 0.9 (L) 1.1 - 2.2         Assessment and Plan: Active Problems:    * No active hospital problems. *     - Hyperemesis Gravidarum:   antiemetic therapy including zofran, phenergan and scop patch. IV hydration. May DC home w/ PO toleration.

## 2022-12-08 NOTE — H&P
History & Physical     Name: Irais Hood MRN: [id number]  SSN: [id number]    YOB: 2000  Age: 20 y.o. Sex: female       Subjective:      Reason for Admission:  Pregnancy and Hyperemesis Gravidarum     History of Present Illness: Ms. Jina Goodrich is a 20 y.o.  female with an estimated gestational age of 6w6d with Estimated Date of Delivery: 23. Patient complains of severe nausea/vomiting for 4 days. Pregnancy has been complicated by nausea and vomiting. Patient denies abdominal pain  , chest pain, contractions, fever, headache , pelvic pressure, right upper quadrant pain  , shortness of breath, swelling, vaginal bleeding , vaginal leaking of fluid , and visual disturbances. Pt was seen yesterday for iv hydration and antiemetics - discharged after feeling better. Pt seen in office today and states that she still can't keep anything down. Has scopolamine patch in place that was given yesterday. OB History    Para Term  AB Living   3 1 1   1 1   SAB IAB Ectopic Molar Multiple Live Births      1       1       # Outcome Date GA Lbr Jesus/2nd Weight Sex Delivery Anes PTL Lv   3 Current                     2 Term 20       F Vag-Spont EPI   PING   1 IAB                             Past Medical History:   Diagnosis Date    Abnormal Papanicolaou smear of cervix      Other ill-defined conditions(689.89)       Pt has had several episodes of postural hypotention    POTS (postural orthostatic tachycardia syndrome)      Thyroid activity decreased              Past Surgical History:   Procedure Laterality Date    HX OTHER SURGICAL          Social History           Occupational History    Not on file   Tobacco Use    Smoking status: Never    Smokeless tobacco: Never   Vaping Use    Vaping Use: Never used   Substance and Sexual Activity    Alcohol use: No    Drug use: No    Sexual activity: Never      History reviewed. No pertinent family history.      No Known Allergies Prior to Admission medications    Medication Sig Start Date End Date Taking? Authorizing Provider   scopolamine (TRANSDERM-SCOP) 1 mg over 3 days pt3d 1 Patch by TransDERmal route every seventy-two (72) hours. 12/7/22   Yes Bridget Alvares CNM   ondansetron (ZOFRAN ODT) 8 mg disintegrating tablet Take 1 Tablet by mouth every six (6) hours. Indications: excessive vomiting in pregnancy  Patient taking differently: Take 4 mg by mouth every four (4) hours. Indications: excessive vomiting in pregnancy 12/3/22   Yes Omari Moraes MD   promethazine (PHENERGAN) 25 mg tablet Take 1 Tablet by mouth every six (6) hours as needed for Nausea (nausea). 12/3/22   Yes Omari Moraes MD   bisacodyL (DULCOLAX) 10 mg supp Insert 10 mg into rectum daily as needed for Constipation. 12/3/22   Yes Omari Moraes MD   levothyroxine (SYNTHROID) 100 mcg tablet Take  by mouth Daily (before breakfast). Yes Provider, Historical   PNV RS.93/JLAVFNA fum/folic ac (PRENATAL PO) Take  by mouth. Yes Provider, Historical   acetaminophen (TYLENOL) 325 mg tablet Take 2 Tabs by mouth every four (4) hours as needed for Pain. Patient not taking: No sig reported 1/12/18     Andrews Miner PA-C   butalbital-acetaminophen-caffeine (FIORICET) -40 mg per tablet Take 1-2 Tabs by mouth every four (4) hours as needed for Pain. Max Daily Amount: 12 Tabs. Patient not taking: Reported on 12/7/2022 11/16/15     Aleyda De La oRsa DO   drospirenone-ethinyl estradioL (KATE) 3-0.02 mg tab Take 1 Tab by mouth daily. Patient not taking: Reported on 12/7/2022       Other, MD Tate         Review of Systems:  A comprehensive review of systems was negative except for that written in the History of Present Illness.       Objective:      Vitals:         Vitals:     12/07/22 1820 12/07/22 1856   BP: 112/74     Pulse: 92     Resp: 18     Temp: 98.4 °F (36.9 °C)     SpO2: 96%     Weight:   95.7 kg (211 lb)   Height:   5' 5\" (1.651 m)      Temp (24hrs), Av.4 °F (36.9 °C), Min:98.4 °F (36.9 °C), Max:98.4 °F (36.9 °C)     BP  Min: 112/74  Max: 112/74      Physical Exam:  Deferred     Membranes:  Intact  Uterine Activity:  None  Fetal Heart Rate:  unable to determine d/t gestational age        Lab/Data Review:  Recent Results         Recent Results (from the past 24 hour(s))   CBC WITH AUTOMATED DIFF     Collection Time: 22  6:23 PM   Result Value Ref Range     WBC 12.0 (H) 3.6 - 11.0 K/uL     RBC 4.59 3.80 - 5.20 M/uL     HGB 14.3 11.5 - 16.0 g/dL     HCT 41.1 35.0 - 47.0 %     MCV 89.5 80.0 - 99.0 FL     MCH 31.2 26.0 - 34.0 PG     MCHC 34.8 30.0 - 36.5 g/dL     RDW 12.0 11.5 - 14.5 %     PLATELET 376 079 - 549 K/uL     MPV 9.0 8.9 - 12.9 FL     NRBC 0.0 0  WBC     ABSOLUTE NRBC 0.00 0.00 - 0.01 K/uL     NEUTROPHILS 73 32 - 75 %     LYMPHOCYTES 19 12 - 49 %     MONOCYTES 6 5 - 13 %     EOSINOPHILS 1 0 - 7 %     BASOPHILS 0 0 - 1 %     IMMATURE GRANULOCYTES 1 (H) 0.0 - 0.5 %     ABS. NEUTROPHILS 8.9 (H) 1.8 - 8.0 K/UL     ABS. LYMPHOCYTES 2.2 0.8 - 3.5 K/UL     ABS. MONOCYTES 0.7 0.0 - 1.0 K/UL     ABS. EOSINOPHILS 0.1 0.0 - 0.4 K/UL     ABS. BASOPHILS 0.0 0.0 - 0.1 K/UL     ABS. IMM. GRANS. 0.1 (H) 0.00 - 0.04 K/UL     DF AUTOMATED     METABOLIC PANEL, COMPREHENSIVE     Collection Time: 22  6:23 PM   Result Value Ref Range     Sodium 138 136 - 145 mmol/L     Potassium 3.4 (L) 3.5 - 5.1 mmol/L     Chloride 106 97 - 108 mmol/L     CO2 21 21 - 32 mmol/L     Anion gap 11 5 - 15 mmol/L     Glucose 146 (H) 65 - 100 mg/dL     BUN 8 6 - 20 MG/DL     Creatinine 0.63 0.55 - 1.02 MG/DL     BUN/Creatinine ratio 13 12 - 20       eGFR >60 >60 ml/min/1.73m2     Calcium 9.2 8.5 - 10.1 MG/DL     Bilirubin, total 0.5 0.2 - 1.0 MG/DL     ALT (SGPT) 28 12 - 78 U/L     AST (SGOT) 13 (L) 15 - 37 U/L     Alk.  phosphatase 83 45 - 117 U/L     Protein, total 7.2 6.4 - 8.2 g/dL     Albumin 3.5 3.5 - 5.0 g/dL     Globulin 3.7 2.0 - 4.0 g/dL     A-G Ratio 0.9 (L) 1.1 - 2.2              Assessment and Plan: Active Problems:    * No active hospital problems. *     - Hyperemesis Gravidarum:   antiemetic therapy including zofran, phenergan and scop patch. IV hydration. May DC home w/ PO toleration.

## 2022-12-08 NOTE — PROGRESS NOTES
Report rec'd from Dilan Ruelas. Care assumed of pt. Awaiting lab results. 2020Randie Prom, CNM reviewed lab results and gives discharge order. CNM advises for pt to follow up tomorrow and Scop patch Rx sent to pharmacy of pt choice. 2030- Pt left unit wi discharge instructions. Pt to follow up in office.

## 2022-12-08 NOTE — DISCHARGE INSTRUCTIONS
Extreme Nausea and Vomiting in Pregnancy: Care Instructions  Your Care Instructions     Nausea and vomiting (often called morning sickness) are common in pregnancy. They are caused by pregnancy hormones and happen most often in the first 3 months. Some women get very sick and are not able to keep down food and fluids. This extreme morning sickness is called hyperemesis gravidarum. It can lead to a dangerous loss of fluids in the body. It also can keep you from gaining weight and getting proper nutrition during your pregnancy. Your body fluids are put back in balance with water and minerals called electrolytes. Medicine may help if you have severe nausea and vomiting. Follow-up care is a key part of your treatment and safety. Be sure to make and go to all appointments, and call your doctor if you are having problems. It's also a good idea to know your test results and keep a list of the medicines you take. How can you care for yourself at home? Take your medicines exactly as prescribed. Call your doctor if you think you are having a problem with your medicine. Drink plenty of fluids to prevent dehydration. Choose water and other clear liquids until you feel better. Try sipping on sports drinks that have salt and sugar in them. Eat a small snack, such as crackers, before you get out of bed. Wait a few minutes, then get out of bed slowly. Keep food in your stomach, but not too much at once. An empty stomach can make nausea worse. Eat several small meals every day instead of three large meals. Eat more protein and less fat. Get plenty of vitamin B6 by eating whole grains, nuts, seeds, and legumes. You can take vitamin B6 tablets if your doctor says it is okay. Try to avoid smells and foods that make you feel sick to your stomach. Get lots of rest.  You may want to try acupressure bands. They put pressure on an acupressure point in the wrist. Some women feel better using the bands.   Karen may also help you feel better. You can use it in tea, take it as a pill, or use a jimi syrup that you can buy at a health food store. When should you call for help? Call 911 anytime you think you may need emergency care. For example, call if:    You passed out (lost consciousness). Call your doctor now or seek immediate medical care if:    You are sick to your stomach or cannot drink fluids. You have symptoms of dehydration, such as:  Dry eyes and a dry mouth. Passing only a little urine. Feeling thirstier than usual.     You are not able to keep down your medicines. You have pain in your belly or pelvis. Watch closely for changes in your health, and be sure to contact your doctor if:    You do not get better as expected. Where can you learn more? Go to http://www.gray.com/  Enter M072 in the search box to learn more about \"Extreme Nausea and Vomiting in Pregnancy: Care Instructions. \"  Current as of: February 23, 2022               Content Version: 13.4  © 2006-2022 [email or web address]. Care instructions adapted under license by [employer] (which disclaims liability or warranty for this information). If you have questions about a medical condition or this instruction, always ask your healthcare professional. [place] [address] any warranty or liability for your use of this information. Learning About Pregnancy  Your Care Instructions     Your health in the early weeks of your pregnancy is particularly important for your baby's health. Take good care of yourself. Anything you do that harms your body can also harm your baby. Make sure to go to all of your doctor appointments. Regular checkups will help keep you and your baby healthy. How can you care for yourself at home? Diet    Eat a balanced diet. Make sure your diet includes plenty of beans, peas, and leafy green vegetables. Do not skip meals or go for many hours without eating.  If you are nauseated, try to eat a small, healthy snack every 2 to 3 hours. Do not eat fish that has a high level of mercury, such as shark, swordfish, or mackerel. Do not eat more than one can of tuna each week. Drink plenty of fluids. If you have kidney, heart, or liver disease and have to limit fluids, talk with your doctor before you increase the amount of fluids you drink. Cut down on caffeine, such as coffee, tea, and cola. Do not drink alcohol, such as beer, wine, or hard liquor. Take a multivitamin that contains at least 400 micrograms (mcg) of folic acid to help prevent birth defects. Fortified cereal and whole wheat bread are good additional sources of folic acid. Increase the calcium in your diet. Try to drink a quart of skim milk each day. You may also take calcium supplements and choose foods such as cheese and yogurt. Lifestyle    Make sure you go to your follow-up appointments. Get plenty of rest. You may be unusually tired while you are pregnant. Get at least 30 minutes of exercise on most days of the week. Walking is a good choice. If you have not exercised in the past, start out slowly. Take several short walks each day. Do not smoke. If you need help quitting, talk to your doctor about stop-smoking programs. These can increase your chances of quitting for good. Do not touch cat feces or litter boxes. Also, wash your hands after you handle raw meat, and fully cook all meat before you eat it. Wear gloves when you work in the yard or garden, and wash your hands well when you are done. Cat feces, raw or undercooked meat, and contaminated dirt can cause an infection that may harm your baby or lead to a miscarriage. Avoid things that can make your body too hot and may be harmful to your baby, such as a hot tub or sauna. Or talk with your doctor before doing anything that raises your body temperature. Your doctor can tell you if it's safe.      Avoid chemical fumes, paint fumes, or poisons. Do not use illegal drugs, marijuana, or alcohol. Medicines    Review all of your medicines with your doctor. Some of your routine medicines may need to be changed to protect your baby. Use acetaminophen (Tylenol) to relieve minor problems, such as a mild headache or backache or a mild fever with cold symptoms. Do not use nonsteroidal anti-inflammatory drugs (NSAIDs), such as ibuprofen (Advil, Motrin) or naproxen (Aleve), unless your doctor says it is okay. Do not take two or more pain medicines at the same time unless the doctor told you to. Many pain medicines have acetaminophen, which is Tylenol. Too much acetaminophen (Tylenol) can be harmful. Take your medicines exactly as prescribed. Call your doctor if you think you are having a problem with your medicine. To manage morning sickness    If you feel sick when you first wake up, try eating a small snack (such as crackers) before you get out of bed. Allow some time to digest the snack, and then get out of bed slowly. Do not skip meals or go for long periods without eating. An empty stomach can make nausea worse. Eat small, frequent meals instead of three large meals each day. Drink plenty of fluids. Eat foods that are high in protein but low in fat. If you are taking iron supplements, ask your doctor if they are necessary. Iron can make nausea worse. Avoid any smells, such as coffee, that make you feel sick. Get lots of rest. Morning sickness may be worse when you are tired. Follow-up care is a key part of your treatment and safety. Be sure to make and go to all appointments, and call your doctor if you are having problems. It's also a good idea to know your test results and keep a list of the medicines you take. Where can you learn more? Go to http://www.gray.com/  Enter T967 in the search box to learn more about \"Learning About Pregnancy. \"  Current as of: February 23, 2022               Content Version: 13.4  © 1087-4140 [employer]. Care instructions adapted under license by [employer] (which disclaims liability or warranty for this information). If you have questions about a medical condition or this instruction, always ask your healthcare professional. [address] any warranty or liability for your use of this information. Call office tomorrow for follow up appointment.

## 2022-12-08 NOTE — PROGRESS NOTES
1618:  Fidelia Ovalle is a 20 y.o.   at 10w6d patient of ESTRELLITA Brasher at [facility] who presents to L&D triage from doctors office with c/o nausea and vomiting. She denies vaginal bleeding, LOF, and contractions. She also denies Scotoma, RUQ pain, and Edema. Urine and blood sample obtained. Initial assessment completed. 1650: RN offering patient anti nausea medications. Patient refused at this time. 1858: Patient provided discharge education. No further questions asked. Patient discharged at this time.

## 2022-12-09 LAB
BACTERIA SPEC CULT: NORMAL
CC UR VC: NORMAL
SERVICE CMNT-IMP: NORMAL

## 2022-12-09 NOTE — PROGRESS NOTES
1800: Orders given for antibiotic from [facility]. Eveline Lyon. 1845: RN spoke to [facility], discharge orders given. 1855: This RN agrees with charting and assessments by YAMILETH Acevedo RN.

## 2022-12-13 ENCOUNTER — HOSPITAL ENCOUNTER (OUTPATIENT)
Age: 22
Setting detail: OBSERVATION
Discharge: HOME | End: 2022-12-16
Attending: SPECIALIST | Admitting: SPECIALIST
Payer: COMMERCIAL

## 2022-12-13 PROBLEM — R11.10 HYPEREMESIS: Status: ACTIVE | Noted: 2022-12-13

## 2022-12-13 LAB
ABO, EXTERNAL RESULT: NORMAL
ALBUMIN SERPL-MCNC: 3.4 G/DL (ref 3.5–5)
ALBUMIN/GLOB SERPL: 1.1 {RATIO} (ref 1.1–2.2)
ALP SERPL-CCNC: 68 U/L (ref 45–117)
ALT SERPL-CCNC: 20 U/L (ref 12–78)
ANION GAP SERPL CALC-SCNC: 8 MMOL/L (ref 5–15)
AST SERPL-CCNC: 12 U/L (ref 15–37)
BILIRUB SERPL-MCNC: 0.6 MG/DL (ref 0.2–1)
BUN SERPL-MCNC: 6 MG/DL (ref 6–20)
BUN/CREAT SERPL: 11 (ref 12–20)
CALCIUM SERPL-MCNC: 9.1 MG/DL (ref 8.5–10.1)
CHLORIDE SERPL-SCNC: 107 MMOL/L (ref 97–108)
CO2 SERPL-SCNC: 25 MMOL/L (ref 21–32)
CREAT SERPL-MCNC: 0.57 MG/DL (ref 0.55–1.02)
ERYTHROCYTE [DISTWIDTH] IN BLOOD BY AUTOMATED COUNT: 12.1 % (ref 11.5–14.5)
GLOBULIN SER CALC-MCNC: 3.1 G/DL (ref 2–4)
GLUCOSE SERPL-MCNC: 79 MG/DL (ref 65–100)
HCT VFR BLD AUTO: 40.2 % (ref 35–47)
HEP B, EXTERNAL RESULT: NEGATIVE
HGB BLD-MCNC: 13.6 G/DL (ref 11.5–16)
HIV, EXTERNAL RESULT: NON REACTIVE
MCH RBC QN AUTO: 31.3 PG (ref 26–34)
MCHC RBC AUTO-ENTMCNC: 33.8 G/DL (ref 30–36.5)
MCV RBC AUTO: 92.4 FL (ref 80–99)
NRBC # BLD: 0 K/UL (ref 0–0.01)
NRBC BLD-RTO: 0 PER 100 WBC
PLATELET # BLD AUTO: 276 K/UL (ref 150–400)
PMV BLD AUTO: 9.4 FL (ref 8.9–12.9)
POTASSIUM SERPL-SCNC: 3.5 MMOL/L (ref 3.5–5.1)
PROT SERPL-MCNC: 6.5 G/DL (ref 6.4–8.2)
RBC # BLD AUTO: 4.35 M/UL (ref 3.8–5.2)
RH FACTOR, EXTERNAL RESULT: POSITIVE
RPR, EXTERNAL RESULT: NON REACTIVE
RUBELLA TITER, EXTERNAL RESULT: NORMAL
SODIUM SERPL-SCNC: 140 MMOL/L (ref 136–145)
WBC # BLD AUTO: 11.6 K/UL (ref 3.6–11)

## 2022-12-13 PROCEDURE — 74011000250 HC RX REV CODE- 250: Performed by: SPECIALIST

## 2022-12-13 PROCEDURE — 74011250636 HC RX REV CODE- 250/636: Performed by: SPECIALIST

## 2022-12-13 PROCEDURE — 96376 TX/PRO/DX INJ SAME DRUG ADON: CPT

## 2022-12-13 PROCEDURE — 36415 COLL VENOUS BLD VENIPUNCTURE: CPT

## 2022-12-13 PROCEDURE — 80053 COMPREHEN METABOLIC PANEL: CPT

## 2022-12-13 PROCEDURE — 96374 THER/PROPH/DIAG INJ IV PUSH: CPT

## 2022-12-13 PROCEDURE — G0378 HOSPITAL OBSERVATION PER HR: HCPCS

## 2022-12-13 PROCEDURE — 85027 COMPLETE CBC AUTOMATED: CPT

## 2022-12-13 RX ORDER — SODIUM CHLORIDE 0.9 % (FLUSH) 0.9 %
5-40 SYRINGE (ML) INJECTION AS NEEDED
Status: DISCONTINUED | OUTPATIENT
Start: 2022-12-13 | End: 2022-12-16 | Stop reason: HOSPADM

## 2022-12-13 RX ORDER — SODIUM CHLORIDE 0.9 % (FLUSH) 0.9 %
5-40 SYRINGE (ML) INJECTION EVERY 8 HOURS
Status: DISCONTINUED | OUTPATIENT
Start: 2022-12-13 | End: 2022-12-16 | Stop reason: HOSPADM

## 2022-12-13 RX ORDER — ONDANSETRON 2 MG/ML
4 INJECTION INTRAMUSCULAR; INTRAVENOUS
Status: DISCONTINUED | OUTPATIENT
Start: 2022-12-13 | End: 2022-12-16 | Stop reason: HOSPADM

## 2022-12-13 RX ADMIN — ONDANSETRON HYDROCHLORIDE 4 MG: 2 INJECTION, SOLUTION INTRAMUSCULAR; INTRAVENOUS at 22:29

## 2022-12-13 RX ADMIN — ONDANSETRON HYDROCHLORIDE 4 MG: 2 INJECTION, SOLUTION INTRAMUSCULAR; INTRAVENOUS at 18:08

## 2022-12-13 RX ADMIN — THIAMINE HYDROCHLORIDE: 100 INJECTION, SOLUTION INTRAMUSCULAR; INTRAVENOUS at 16:02

## 2022-12-13 NOTE — PROGRESS NOTES
Problem: General Medical Care Plan  Goal: *Vital signs within specified parameters  Outcome: Progressing Towards Goal  Goal: *Labs within defined limits  Outcome: Progressing Towards Goal  Goal: *Fluid volume balance  Outcome: Progressing Towards Goal  Goal: *Optimize nutritional status  Outcome: Progressing Towards Goal  Goal: *Anxiety reduced or absent  Outcome: Progressing Towards Goal     Problem: Nausea/Vomiting (Adult)  Goal: *Absence of nausea/vomiting  Outcome: Progressing Towards Goal

## 2022-12-13 NOTE — H&P
History & Physical    Name: Al Kumar MRN: [id number]  SSN: [id number]    YOB: 2000  Age: 20 y.o. Sex: female      Subjective:     Reason for Admission:  Pregnancy and Hyperemesis Gravidarum    History of Present Illness: Ms. Janel Price is a 20 y.o.  female with an estimated gestational age of 9w2d with Estimated Date of Delivery: 23. Patient complains of  emesis  for many days. Pregnancy has been complicated by  none . Patient denies vaginal bleeding  and vaginal leaking of fluid .     OB History    Para Term  AB Living   3 1 1   1 1   SAB IAB Ectopic Molar Multiple Live Births     1       1      # Outcome Date GA Lbr Jesus/2nd Weight Sex Delivery Anes PTL Lv   3 Current            2 Term 20    F Vag-Spont EPI  PING   1 IAB              Past Medical History:   Diagnosis Date    Abnormal Papanicolaou smear of cervix     Other ill-defined conditions(799.89)     Pt has had several episodes of postural hypotention    POTS (postural orthostatic tachycardia syndrome)     Thyroid activity decreased      Past Surgical History:   Procedure Laterality Date    HX OTHER SURGICAL       Social History     Occupational History    Not on file   Tobacco Use    Smoking status: Never    Smokeless tobacco: Never   Vaping Use    Vaping Use: Never used   Substance and Sexual Activity    Alcohol use: No    Drug use: No    Sexual activity: Never      Family History   Problem Relation Age of Onset    Psychiatric Disorder Mother     Headache Mother     Asthma Mother     Hypertension Mother     Psychiatric Disorder Sister     Asthma Sister     Psychiatric Disorder Brother     Asthma Brother     Psychiatric Disorder Maternal Aunt     Headache Maternal Aunt     Hypertension Maternal Aunt     Hypertension Maternal Uncle     Cancer Maternal Grandmother     Hypertension Maternal Grandmother     Diabetes Maternal Grandmother     Hypertension Maternal Grandfather        No Known Allergies  Prior to Admission medications    Medication Sig Start Date End Date Taking? Authorizing Provider   scopolamine (TRANSDERM-SCOP) 1 mg over 3 days pt3d 1 Patch by TransDERmal route every seventy-two (72) hours. 22   Cara Dixon CNM   ondansetron (ZOFRAN ODT) 8 mg disintegrating tablet Take 1 Tablet by mouth every six (6) hours. Indications: excessive vomiting in pregnancy  Patient taking differently: Take 4 mg by mouth every four (4) hours. Indications: excessive vomiting in pregnancy 12/3/22   Shamar Moraes MD   promethazine (PHENERGAN) 25 mg tablet Take 1 Tablet by mouth every six (6) hours as needed for Nausea (nausea). 12/3/22   Shamar Moraes MD   bisacodyL (DULCOLAX) 10 mg supp Insert 10 mg into rectum daily as needed for Constipation. 12/3/22   Shamar Moraes MD   levothyroxine (SYNTHROID) 100 mcg tablet Take  by mouth Daily (before breakfast). Provider, Historical   PNV TY.01/KBUGULG fum/folic ac (PRENATAL PO) Take  by mouth. Provider, Historical   acetaminophen (TYLENOL) 325 mg tablet Take 2 Tabs by mouth every four (4) hours as needed for Pain. Patient not taking: No sig reported 18   Carolina Carter PA-C   butalbital-acetaminophen-caffeine (FIORICET) -40 mg per tablet Take 1-2 Tabs by mouth every four (4) hours as needed for Pain. Max Daily Amount: 12 Tabs. Patient not taking: No sig reported 11/16/15   Quentin Santana DO   drospirenone-ethinyl estradioL (KATE) 3-0.02 mg tab Take 1 Tab by mouth daily. Patient not taking: No sig reported    Other, MD Tate        Review of Systems:  A comprehensive review of systems was negative except for that written in the History of Present Illness.      Objective:     Vitals:    Vitals:    22 1214   BP: (!) 100/55   Pulse: 64   Resp: 16   Temp: 98.5 °F (36.9 °C)   SpO2: 94%      Temp (24hrs), Av.5 °F (36.9 °C), Min:98.5 °F (36.9 °C), Max:98.5 °F (36.9 °C)    BP  Min: 100/55  Max: 100/55     Physical Exam:  Ab soft, nt  Ext nt     Membranes:  Intact  Uterine Activity:  n/a  Fetal Heart Rate:  present in office today       Lab/Data Review:  No results found for this or any previous visit (from the past 24 hour(s)). Assessment and Plan:      Active Problems:    Hyperemesis (12/13/2022)       - Hyperemesis Gravidarum:   admit for IV hydration, THIAMINE  And possible PICC line

## 2022-12-14 LAB
T4 FREE SERPL-MCNC: 1 NG/DL (ref 0.8–1.5)
TSH SERPL DL<=0.05 MIU/L-ACNC: 1.49 UIU/ML (ref 0.36–3.74)

## 2022-12-14 PROCEDURE — G0378 HOSPITAL OBSERVATION PER HR: HCPCS

## 2022-12-14 PROCEDURE — 74011000258 HC RX REV CODE- 258: Performed by: SPECIALIST

## 2022-12-14 PROCEDURE — 96376 TX/PRO/DX INJ SAME DRUG ADON: CPT

## 2022-12-14 PROCEDURE — 96375 TX/PRO/DX INJ NEW DRUG ADDON: CPT

## 2022-12-14 PROCEDURE — 84443 ASSAY THYROID STIM HORMONE: CPT

## 2022-12-14 PROCEDURE — 74011250637 HC RX REV CODE- 250/637: Performed by: MIDWIFE

## 2022-12-14 PROCEDURE — 74011000250 HC RX REV CODE- 250: Performed by: SPECIALIST

## 2022-12-14 PROCEDURE — 84439 ASSAY OF FREE THYROXINE: CPT

## 2022-12-14 PROCEDURE — 36415 COLL VENOUS BLD VENIPUNCTURE: CPT

## 2022-12-14 PROCEDURE — 74011250636 HC RX REV CODE- 250/636: Performed by: SPECIALIST

## 2022-12-14 PROCEDURE — 74011250637 HC RX REV CODE- 250/637: Performed by: NURSE PRACTITIONER

## 2022-12-14 RX ORDER — SERTRALINE HYDROCHLORIDE 50 MG/1
50 TABLET, FILM COATED ORAL DAILY
Status: DISCONTINUED | OUTPATIENT
Start: 2022-12-14 | End: 2022-12-16 | Stop reason: HOSPADM

## 2022-12-14 RX ORDER — SCOLOPAMINE TRANSDERMAL SYSTEM 1 MG/1
1 PATCH, EXTENDED RELEASE TRANSDERMAL
Status: DISCONTINUED | OUTPATIENT
Start: 2022-12-14 | End: 2022-12-16 | Stop reason: HOSPADM

## 2022-12-14 RX ORDER — ACETAMINOPHEN 325 MG/1
650 TABLET ORAL
Status: DISCONTINUED | OUTPATIENT
Start: 2022-12-14 | End: 2022-12-16 | Stop reason: HOSPADM

## 2022-12-14 RX ADMIN — ONDANSETRON HYDROCHLORIDE 4 MG: 2 INJECTION, SOLUTION INTRAMUSCULAR; INTRAVENOUS at 08:39

## 2022-12-14 RX ADMIN — SODIUM CHLORIDE, PRESERVATIVE FREE 10 ML: 5 INJECTION INTRAVENOUS at 08:38

## 2022-12-14 RX ADMIN — ONDANSETRON HYDROCHLORIDE 4 MG: 2 INJECTION, SOLUTION INTRAMUSCULAR; INTRAVENOUS at 19:46

## 2022-12-14 RX ADMIN — SERTRALINE 50 MG: 50 TABLET, FILM COATED ORAL at 10:34

## 2022-12-14 RX ADMIN — THIAMINE HYDROCHLORIDE: 100 INJECTION, SOLUTION INTRAMUSCULAR; INTRAVENOUS at 15:55

## 2022-12-14 RX ADMIN — SODIUM CHLORIDE, PRESERVATIVE FREE 10 ML: 5 INJECTION INTRAVENOUS at 14:24

## 2022-12-14 RX ADMIN — PROMETHAZINE HYDROCHLORIDE 25 MG: 25 INJECTION INTRAMUSCULAR; INTRAVENOUS at 23:49

## 2022-12-14 RX ADMIN — PROMETHAZINE HYDROCHLORIDE 25 MG: 25 INJECTION INTRAMUSCULAR; INTRAVENOUS at 17:41

## 2022-12-14 RX ADMIN — ACETAMINOPHEN 650 MG: 325 TABLET ORAL at 14:24

## 2022-12-14 NOTE — PROGRESS NOTES
Psych consult called to [phone]. Spoke with [facility] who placed the consult to Joleen Mederos NP. [facility] the provider will complete the consult within 24 hours. Patient updated on POC.

## 2022-12-14 NOTE — ROUTINE PROCESS
Bedside and Verbal shift change report given to ESTRELLITA Darling RN (oncoming nurse) by [facility]. Doneta Bloch RN (offgoing nurse). Report included the following information SBAR, Procedure Summary, Intake/Output, MAR, Recent Results, and Med Rec Status.

## 2022-12-14 NOTE — CONSULTS
PSYCHIATRY CONSULT NOTE    REASON FOR CONSULT:anxiety with depression      HISTORY OF PRESENTING COMPLAINT:  Darryl Haas is a 20 y.o. WHITE/NON- female who is currently admitted to the medical floor at [facility]. Patient is admitted due to hyperemesis gravidarum. Psychiatry is asked to see patient for anxiety and depression. Patient is received resting in bed. She is alert and oriented in all sphere, calm and cooperative. She reports anxiety and denies depression. She states that she has had anxiety since she was in high school and its been on and off but recently she has been more worried and stressed about bills and taking care of her daughter. She states that she has been out of work and the house hold only have one income. She shares that her whole family has anxiety and depression. She denies current suicidal/homicidal thoughts and AV hallucinations. She reports she sleeps ok but her appetite comes and goes couple with the fact that she has nausea. She denies hx mental illness. PAST PSYCHIATRIC HISTORY and SUBSTANCE ABUSE HISTORY:  Denies past hospitalization and suicide attempt. No outpatient mental health services. Family hx of anxiety and depression  Denies substance use    PAST MEDICAL HISTORY:    Please see H&P for details. Past Medical History:   Diagnosis Date    Abnormal Papanicolaou smear of cervix     Other ill-defined conditions(249.51)     Pt has had several episodes of postural hypotention    POTS (postural orthostatic tachycardia syndrome)     Thyroid activity decreased      Prior to Admission medications    Medication Sig Start Date End Date Taking? Authorizing Provider   scopolamine (TRANSDERM-SCOP) 1 mg over 3 days pt3d 1 Patch by TransDERmal route every seventy-two (72) hours. 12/7/22  Yes Lela Colon CNM   ondansetron (ZOFRAN ODT) 8 mg disintegrating tablet Take 1 Tablet by mouth every six (6) hours.  Indications: excessive vomiting in pregnancy  Patient taking differently: Take 4 mg by mouth every four (4) hours. Indications: excessive vomiting in pregnancy 12/3/22  Yes Omari Moraes MD   promethazine (PHENERGAN) 25 mg tablet Take 1 Tablet by mouth every six (6) hours as needed for Nausea (nausea). 12/3/22  Yes Omari Moraes MD   bisacodyL (DULCOLAX) 10 mg supp Insert 10 mg into rectum daily as needed for Constipation. 12/3/22  Yes Omari Moraes MD   levothyroxine (SYNTHROID) 100 mcg tablet Take  by mouth Daily (before breakfast). Yes Provider, Historical   PNV DT.28/ADEEL fum/folic ac (PRENATAL PO) Take  by mouth. Yes Provider, Historical   acetaminophen (TYLENOL) 325 mg tablet Take 2 Tabs by mouth every four (4) hours as needed for Pain. Patient not taking: No sig reported 1/12/18   Tameka Bryan PA-C   butalbital-acetaminophen-caffeine (FIORICET) -40 mg per tablet Take 1-2 Tabs by mouth every four (4) hours as needed for Pain. Max Daily Amount: 12 Tabs. Patient not taking: No sig reported 11/16/15   Alesia Orona DO   drospirenone-ethinyl estradioL (KATE) 3-0.02 mg tab Take 1 Tab by mouth daily.   Patient not taking: No sig reported    Other, MD Tate     Vitals:    12/13/22 1943 12/13/22 2316 12/14/22 0748 12/14/22 1240   BP: 112/65 (!) 92/51 (!) 102/52    Pulse: 75 62 64    Resp: 17 18 16    Temp: 98.5 °F (36.9 °C) 98.3 °F (36.8 °C) 98.4 °F (36.9 °C)    SpO2: 97% 96% 98%    Height:    5' 5\" (1.651 m)     Lab Results   Component Value Date/Time    WBC 11.6 (H) 12/13/2022 03:53 PM    HGB 13.6 12/13/2022 03:53 PM    HCT 40.2 12/13/2022 03:53 PM    PLATELET 961 92/23/5317 03:53 PM    MCV 92.4 12/13/2022 03:53 PM     Lab Results   Component Value Date/Time    Sodium 140 12/13/2022 03:53 PM    Potassium 3.5 12/13/2022 03:53 PM    Chloride 107 12/13/2022 03:53 PM    CO2 25 12/13/2022 03:53 PM    Anion gap 8 12/13/2022 03:53 PM    Glucose 79 12/13/2022 03:53 PM    BUN 6 12/13/2022 03:53 PM    Creatinine 0.57 12/13/2022 03:53 PM    BUN/Creatinine ratio 11 (L) 12/13/2022 03:53 PM    GFR est AA >60 01/12/2018 01:34 PM    GFR est non-AA >60 01/12/2018 01:34 PM    Calcium 9.1 12/13/2022 03:53 PM    Bilirubin, total 0.6 12/13/2022 03:53 PM    Alk. phosphatase 68 12/13/2022 03:53 PM    Protein, total 6.5 12/13/2022 03:53 PM    Albumin 3.4 (L) 12/13/2022 03:53 PM    Globulin 3.1 12/13/2022 03:53 PM    A-G Ratio 1.1 12/13/2022 03:53 PM    ALT (SGPT) 20 12/13/2022 03:53 PM    AST (SGOT) 12 (L) 12/13/2022 03:53 PM     No results found for: VALF2, VALAC, VALP, VALPR, DS6, CRBAM, CRBAMP, CARB2, XCRBAM  No results found for: LITHM  RADIOLOGY REPORTS:(reviewed/updated 12/14/2022)  No results found. Lab Results   Component Value Date/Time    HCG urine, QL NEGATIVE 01/12/2018 01:05 PM    Pregnancy test,urine (POC) NEGATIVE 09/05/2015 09:09 PM       PSYCHOSOCIAL HISTORY: States she is engaged and has a 21 month old child. 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 MENTAL STATUS EXAM:    General appearance:  moderately  groomed, psychomotor activity is wnl  Eye contact: good eye contact  Speech: Spontaneous, soft, decreased output. Affect : euthymic  Mood: \"ok \"  Thought Process: Logical, goal directed  Perception: Denies AH or VH. Thought Content: denies SI/HI or Plan  Insight: Partial  Judgement: Fair  Cognition: Intact grossly. ASSESSMENT AND PLAN:  Wali Bergeron meets criteria for a diagnosis of  anxiety disorder unspecified. Patient is already on appropriate medication Zoloft 50mg  to help with anxiety and depression. No safety concerns at this time  Discharged when medically cleared. Psych admission is not recommended. Thank your your consult. Please feel free to consult us again as needed.

## 2022-12-14 NOTE — ROUTINE PROCESS
Bedside and Verbal shift change report given to ESTRELLITA Berry RN (oncoming nurse) by ESTRELLITA Toscano RN (offgoing nurse). Report included the following information SBAR, Procedure Summary, Intake/Output, MAR, Recent Results, and Med Rec Status.

## 2022-12-14 NOTE — PROGRESS NOTES
Elif 82 care of pt. Pt reports increasing waves of nausea. No vomiting since this morning. Pt was able to eat a regular dinner around 1930.

## 2022-12-14 NOTE — PROGRESS NOTES
Comprehensive Nutrition Assessment    Type and Reason for Visit: Initial, MST    Nutrition Recommendations/Plan:   Continue regular diet. Add Ensure Clear BID. Please document % meals and supplements consumed in flowsheet I/O's under intake. Malnutrition Assessment:  Malnutrition Status:  Insufficient data (12/14/22 1239)      Nutrition Assessment:     12/13 Chart reviewed for positive MST. Pt admitted for hyperemesis gravidarum on 12/13 (previously admitted earlier this month for same dx). Visited pt at bedside who reports her tolerance of food/emesis has varied over the last few days. RN present at time of visit reports Zofran is on board and pt being given banana bag q24 hrs. Pt had breakfast tray at bedside and there was a good amount of Malian toast remaing (~50%). Had peanut butter crackers at bedside. Pt reports she tried chicken salad last night and did not tolerate it, but normally she likes this. Pt has been having 2 Pedialytes popsicles at home. Does not like taste of Premier shakes, doesn't think she will be able to tolerate a similar ONS or pudding. Will add Ensure Clear BID and monitor tolerance. Provided and explained hyperemesis nutrition therapy handout. Discussed consuming smaller, more frequent meals that are bland and ensuring adequate hydration. Pt verbalized understanding and had no nutrition questions at this time. Weight on file is from previous admission however pt reports weight loss; insufficient data for malnutrition assessment. Will continue to monitor while inpatient. Wt Readings from Last 3 Encounters:   12/08/22 95.7 kg (211 lb)   12/07/22 95.7 kg (211 lb)   12/02/22 97.1 kg (214 lb)   ]    Nutrition Education:  Educated on Hyperemesis Nutrition Therapy  Learners: Patient  Readiness: Acceptance  Method: Explanation and Handout  Response: Verbalizes Understanding  Contact name and number provided.      Nutrition Related Findings:    Labs: AST 12, BG 79   Meds: IV NaCl w/ MVI, Zofran, Sertraline   BM: 12/10   Edema: None   Wound Type: None    Current Nutrition Intake & Therapies:  Average Meal Intake: unable to assess (frequent hyperemesis)     ADULT DIET Regular    Anthropometric Measures:  Height: 5' 5\" (165.1 cm)  Ideal Body Weight (IBW): 125 lbs (57 kg)     Current Body Wt:  95.7 kg (210 lb 15.7 oz), 168.8 % IBW. Stated  Current BMI (kg/m2): 35.1        Weight Adjustment: No adjustment                 BMI Category: Obese class 2 (BMI 35.0-39. 9)    Estimated Daily Nutrient Needs:  Energy Requirements Based On: Formula  Weight Used for Energy Requirements: Current  Energy (kcal/day): 2228 kcals (BMR 1714 x 1.3 AF)  Weight Used for Protein Requirements: Current  Protein (g/day): 96 g/day (1.0 g/kg)  Method Used for Fluid Requirements: 1 ml/kcal  Fluid (ml/day): 2200 mL    Nutrition Diagnosis:   Inadequate protein-energy intake related to  (hyperemesis) as evidenced by  (weight loss and pt report of frequent emesis)    Nutrition Interventions:   Food and/or Nutrient Delivery: Continue current diet, Start oral nutrition supplement  Nutrition Education/Counseling: No recommendations at this time  Coordination of Nutrition Care: Continue to monitor while inpatient       Goals:     Goals: PO intake 50% or greater, by next RD assessment       Nutrition Monitoring and Evaluation:   Behavioral-Environmental Outcomes: None identified  Food/Nutrient Intake Outcomes: Food and nutrient intake, Supplement intake  Physical Signs/Symptoms Outcomes: Skin, Weight, Biochemical data, Nausea/vomiting    Discharge Planning:    Continue current diet, Continue oral nutrition supplement    Sally Macias  Contact:

## 2022-12-14 NOTE — PROGRESS NOTES
RN called and spoke with Zoe Kay NP and updated her with patient's thyroid results. Also, updated NP that patient has a headache. Orders received for 650 mg of po tylenol q 4 hours prn for mild pain/headache. Patient states that she has been having a headache and mild visual disturbances the past few weeks. No epigastric pain. Updated Zoe Kay NP that psych just saw the patient.

## 2022-12-14 NOTE — ROUTINE PROCESS
Bedside and Verbal shift change report given to LILIYA Araujo RN (oncoming nurse) by A. Leopold Coats RN (offgoing nurse). Report included the following information SBAR, Procedure Summary, Intake/Output, MAR, Recent Results, and Med Rec Status.

## 2022-12-14 NOTE — PROGRESS NOTES
Ante Partum Progress Note    Ebb Kelly  11w5d  Pt reports n/v sx improved but is very tearful during intake. She is concerned with multiple admissions for hydration. She reports increased anxiety and depression. She denies thoughts self harm. Vitals: Patient Vitals for the past 24 hrs:   BP Temp Pulse Resp SpO2   12/14/22 0748 (!) 102/52 98.4 °F (36.9 °C) 64 16 98 %   12/13/22 2316 (!) 92/51 98.3 °F (36.8 °C) 62 18 96 %   12/13/22 1943 112/65 98.5 °F (36.9 °C) 75 17 97 %   12/13/22 1754 119/72 97.9 °F (36.6 °C) 87 18 98 %   12/13/22 1214 (!) 100/55 98.5 °F (36.9 °C) 64 16 94 %       Intake and Output:   Current shift:  No intake/output data recorded. Last 3 completed shifts: No intake/output data recorded. Non stress test:      Uterine Activity:      Exam:  Patient without distress.      Abdomen, fundus soft non-tender     Extremities, no redness or tenderness               Additional Exam: Deferred    Labs:     Lab Results   Component Value Date/Time    WBC 11.6 (H) 12/13/2022 03:53 PM    WBC 11.9 (H) 12/08/2022 04:42 PM    WBC 12.0 (H) 12/07/2022 06:23 PM    WBC 9.8 12/02/2022 06:08 PM    WBC 9.5 01/12/2018 01:34 PM    WBC 6.5 09/05/2015 09:30 PM    WBC 5.2 01/09/2011 05:30 PM    HGB 13.6 12/13/2022 03:53 PM    HGB 13.5 12/08/2022 04:42 PM    HGB 14.3 12/07/2022 06:23 PM    HGB 14.3 12/02/2022 06:08 PM    HGB 13.6 01/12/2018 01:34 PM    HGB 11.7 09/05/2015 09:30 PM    HGB 11.9 01/09/2011 05:30 PM    HCT 40.2 12/13/2022 03:53 PM    HCT 39.4 12/08/2022 04:42 PM    HCT 41.1 12/07/2022 06:23 PM    HCT 42.2 12/02/2022 06:08 PM    HCT 39.9 01/12/2018 01:34 PM    HCT 35.1 09/05/2015 09:30 PM    HCT 35.5 01/09/2011 05:30 PM    PLATELET 319 93/61/9008 03:53 PM    PLATELET 926 70/89/4307 04:42 PM    PLATELET 781 30/92/2580 06:23 PM    PLATELET 195 33/37/4308 06:08 PM    PLATELET 120 15/04/9598 01:34 PM    PLATELET 537 16/64/4451 09:30 PM    PLATELET 459 (L) 83/10/7119 05:30 PM       Recent Results (from the past 24 hour(s))   METABOLIC PANEL, COMPREHENSIVE    Collection Time: 12/13/22  3:53 PM   Result Value Ref Range    Sodium 140 136 - 145 mmol/L    Potassium 3.5 3.5 - 5.1 mmol/L    Chloride 107 97 - 108 mmol/L    CO2 25 21 - 32 mmol/L    Anion gap 8 5 - 15 mmol/L    Glucose 79 65 - 100 mg/dL    BUN 6 6 - 20 MG/DL    Creatinine 0.57 0.55 - 1.02 MG/DL    BUN/Creatinine ratio 11 (L) 12 - 20      eGFR >60 >60 ml/min/1.73m2    Calcium 9.1 8.5 - 10.1 MG/DL    Bilirubin, total 0.6 0.2 - 1.0 MG/DL    ALT (SGPT) 20 12 - 78 U/L    AST (SGOT) 12 (L) 15 - 37 U/L    Alk.  phosphatase 68 45 - 117 U/L    Protein, total 6.5 6.4 - 8.2 g/dL    Albumin 3.4 (L) 3.5 - 5.0 g/dL    Globulin 3.1 2.0 - 4.0 g/dL    A-G Ratio 1.1 1.1 - 2.2     CBC W/O DIFF    Collection Time: 12/13/22  3:53 PM   Result Value Ref Range    WBC 11.6 (H) 3.6 - 11.0 K/uL    RBC 4.35 3.80 - 5.20 M/uL    HGB 13.6 11.5 - 16.0 g/dL    HCT 40.2 35.0 - 47.0 %    MCV 92.4 80.0 - 99.0 FL    MCH 31.3 26.0 - 34.0 PG    MCHC 33.8 30.0 - 36.5 g/dL    RDW 12.1 11.5 - 14.5 %    PLATELET 328 469 - 092 K/uL    MPV 9.4 8.9 - 12.9 FL    NRBC 0.0 0  WBC    ABSOLUTE NRBC 0.00 0.00 - 0.01 K/uL       Assessment: 11w5d   Hyperemesis  Depression  hypothyroidism    Plan :   Discussed POC with Dr. Ananda Wise    Hypotensive; Monitor po intake; IV hydration PRN; Prn jason Cho consult ; start sertraline today  Collect tsh/ free t4 today

## 2022-12-15 PROCEDURE — G0378 HOSPITAL OBSERVATION PER HR: HCPCS

## 2022-12-15 PROCEDURE — 96376 TX/PRO/DX INJ SAME DRUG ADON: CPT

## 2022-12-15 PROCEDURE — 74011250637 HC RX REV CODE- 250/637: Performed by: NURSE PRACTITIONER

## 2022-12-15 PROCEDURE — 74011000258 HC RX REV CODE- 258: Performed by: SPECIALIST

## 2022-12-15 PROCEDURE — 74011000250 HC RX REV CODE- 250: Performed by: SPECIALIST

## 2022-12-15 PROCEDURE — 74011250636 HC RX REV CODE- 250/636: Performed by: SPECIALIST

## 2022-12-15 RX ADMIN — SERTRALINE 50 MG: 50 TABLET, FILM COATED ORAL at 09:21

## 2022-12-15 RX ADMIN — ONDANSETRON HYDROCHLORIDE 4 MG: 2 INJECTION, SOLUTION INTRAMUSCULAR; INTRAVENOUS at 00:22

## 2022-12-15 RX ADMIN — ONDANSETRON HYDROCHLORIDE 4 MG: 2 INJECTION, SOLUTION INTRAMUSCULAR; INTRAVENOUS at 09:27

## 2022-12-15 RX ADMIN — ONDANSETRON HYDROCHLORIDE 4 MG: 2 INJECTION, SOLUTION INTRAMUSCULAR; INTRAVENOUS at 14:15

## 2022-12-15 RX ADMIN — ONDANSETRON HYDROCHLORIDE 4 MG: 2 INJECTION, SOLUTION INTRAMUSCULAR; INTRAVENOUS at 19:00

## 2022-12-15 RX ADMIN — SODIUM CHLORIDE, PRESERVATIVE FREE 10 ML: 5 INJECTION INTRAVENOUS at 22:05

## 2022-12-15 RX ADMIN — PROMETHAZINE HYDROCHLORIDE 25 MG: 25 INJECTION INTRAMUSCULAR; INTRAVENOUS at 22:06

## 2022-12-15 RX ADMIN — THIAMINE HYDROCHLORIDE: 100 INJECTION, SOLUTION INTRAMUSCULAR; INTRAVENOUS at 16:38

## 2022-12-15 NOTE — PROGRESS NOTES
High Risk Obstetrics Progress Note    Name: Aisha Birmingham MRN: [id number]  SSN: [id number]    YOB: 2000  Age: 20 y.o. Sex: female      Subjective:      LOS: 0 days    Estimated Date of Delivery: 23   Gestational Age Today: 11w6d     Patient admitted for hyperemesis gravidarum. States she does not have any nausea and vomiting currently. She is doing overall well. Denies abdominal pain. Objective:     Vitals:  Blood pressure (!) 93/54, pulse (!) 58, temperature 98.3 °F (36.8 °C), resp. rate 18, height 5' 5\" (1.651 m), SpO2 99 %. Temp (24hrs), Av.3 °F (36.8 °C), Min:98.1 °F (36.7 °C), Max:98.4 °F (65.4 °C)    Systolic (21UDJ), EEH:868 , Min:93 , QWX:822      Diastolic (85HGV), JDF:67, Min:52, Max:63       Intake and Output:         Physical Exam:  Patient without distress. Heart: Regular rate and rhythm, S1S2 present, or without murmur or extra heart sounds  Lung: clear to auscultation throughout lung fields, no wheezes, no rales, no rhonchi, and normal respiratory effort  Abdomen: soft, nontender           Labs:   Recent Results (from the past 36 hour(s))   TSH 3RD GENERATION    Collection Time: 22 10:35 AM   Result Value Ref Range    TSH 1.49 0.36 - 3.74 uIU/mL   T4, FREE    Collection Time: 22 10:35 AM   Result Value Ref Range    T4, Free 1.0 0.8 - 1.5 NG/DL       Assessment and Plan:       Active Problems:    Hyperemesis (2022)       Hyperemesis Gravidarum:  Antiemetic Therapy including Phenergan/Zofran/Reglan/Zantac  Aggressive intravenous hydration  Dietary consult  Strict Input and Output and Daily weights

## 2022-12-15 NOTE — ROUTINE PROCESS
Verbal report given to AURA He RN           for routine progression of care   Report consisted of patients Situation, Background, Assessment and   Recommendations(SBAR). Information from the following report(s) Kardex, OR Summary, Intake/Output and MAR was reviewed with the receiving nurse. Opportunity for questions and clarification was provided.

## 2022-12-15 NOTE — PROGRESS NOTES
Comprehensive Nutrition Assessment    Type and Reason for Visit: Consult, Initial    Nutrition Recommendations/Plan:   Continue regular diet. Continue Ensure Clear BID. Please document % meals and supplements consumed in flowsheet I/O's under intake. Malnutrition Assessment:  Malnutrition Status:  Insufficient data (12/14/22 1239)      Nutrition Assessment:     12/15 Chart reviewed for consult (diet education). Med noted for hyperemesis gravidarum. Previously received consult for general diet education and visited pt yesterday. Ensure Clear was added BID since pt does not like the taste of other supplements. Visit pt at bedside who has been sleeping most of the morning. For breakfast, she ate a few bites of her fruit cup, a few bites of a plain bagel, and a few bites of cereal. Pt reports no emesis since yesterday afternoon after the time I visited her. Pt remained nauseous last evening, but nausea is reduced today (nausea typically occurs in the afternoons). Pt saw Ensure Clear on her tray this morning but has not tried it. Encouraged intake for extra kcals and protein; pt willing to try and will continue BID (prefers apple). Pt had untouched lunch at bedside; does not want fish but will eat green beans and potatoes. Also sent one time message for a grilled cheese per pt request. Pt had no questions about the hyperemesis nutrition therapy documents provided yesterday. Encouraged pt to call with any questions. Will continue to monitor while inpatient. Nutrition Education (12/14): Educated on Hyperemesis Nutrition Therapy  Learners: Patient  Readiness: Acceptance  Method: Explanation and Handout  Response: Verbalizes Understanding  Contact name and number provided.      Wt Readings from Last 10 Encounters:   12/08/22 95.7 kg (211 lb)   12/07/22 95.7 kg (211 lb)   12/02/22 97.1 kg (214 lb)   01/12/18 76.9 kg (169 lb 8.5 oz) (92 %, Z= 1.43)*   11/16/15 56.9 kg (59 %, Z= 0.23)*   09/05/15 60 kg (70 %, Z= 0.54)* 08/04/13 55.6 kg (69 %, Z= 0.50)*   10/11/11 (!) 47.6 kg (65 %, Z= 0.39)*   01/09/11 (!) 41.6 kg (54 %, Z= 0.11)*   11/11/10 (!) 42.1 kg (60 %, Z= 0.25)*     * Growth percentiles are based on [facility] (Girls, 2-20 Years) data.   ]    Nutrition Related Findings:    No labs since 12/13   Meds: banana bag, Zofran, Zolofr   BM: 12/10   Edema: None   Wound Type: None    Current Nutrition Intake & Therapies:  Average Meal Intake: 1-25%  Average Supplement Intake: 0%  ADULT DIET Regular  ADULT ORAL NUTRITION SUPPLEMENT Breakfast, Dinner; Clear Liquid  DIET ONE TIME MESSAGE    Anthropometric Measures:  Height: 5' 5\" (165.1 cm)  Ideal Body Weight (IBW): 125 lbs (57 kg)     Current Body Wt:  95.7 kg (210 lb 15.7 oz), 168.8 % IBW. Stated  Current BMI (kg/m2): 35.1        Weight Adjustment: No adjustment                 BMI Category: Obese class 2 (BMI 35.0-39. 9)    Estimated Daily Nutrient Needs:  Energy Requirements Based On: Formula  Weight Used for Energy Requirements: Current  Energy (kcal/day): 2228 kcals (BMR 1714 x 1.3 AF)  Weight Used for Protein Requirements: Current  Protein (g/day): 96 g/day (1.0 g/kg)  Method Used for Fluid Requirements: 1 ml/kcal  Fluid (ml/day): 2200 mL    Nutrition Diagnosis:   Inadequate protein-energy intake related to  (hyperemesis) as evidenced by  (weight loss and pt report of frequent emesis)  Previous dx improving (slowly)    Nutrition Interventions:   Food and/or Nutrient Delivery: Continue current diet, Continue oral nutrition supplement  Nutrition Education/Counseling: No recommendations at this time  Coordination of Nutrition Care: Continue to monitor while inpatient       Goals:  Previous Goal Met: Progressing toward goal(s)  Goals: PO intake 50% or greater, by next RD assessment       Nutrition Monitoring and Evaluation:   Behavioral-Environmental Outcomes: None identified  Food/Nutrient Intake Outcomes: Food and nutrient intake, Supplement intake  Physical Signs/Symptoms Outcomes: Biochemical data, Skin, Weight, Nausea/vomiting, GI status    Discharge Planning:    Continue oral nutrition supplement, Continue current diet    Pate Billing  Contact:

## 2022-12-15 NOTE — PROGRESS NOTES
0720: Bedside and Verbal shift change report given to SURJIT Mclain RN (oncoming nurse) by AURA Pfeiffer (offgoing nurse). Report included the following information SBAR, Kardex, Intake/Output, MAR, and Recent Results. 1930: Bedside and Verbal shift change report given to KRYSTYNA Irving (oncoming nurse) by Jasmin Mclain RN (offgoing nurse). Report included the following information SBAR, Kardex, Intake/Output, MAR, and Recent Results.

## 2022-12-15 NOTE — PROGRESS NOTES
Nutrition: Consult received for general nutrition management. Nutrition team already saw patient yesterday; please see full assessment from Dietetic Intern Bunny Croft. Supplements started and educational handouts provided. Will continue to follow. Thanks.   Barbara Zhu, [address]  Ext [phone]

## 2022-12-16 VITALS
TEMPERATURE: 98.2 F | SYSTOLIC BLOOD PRESSURE: 105 MMHG | HEIGHT: 65 IN | HEART RATE: 79 BPM | OXYGEN SATURATION: 98 % | RESPIRATION RATE: 16 BRPM | DIASTOLIC BLOOD PRESSURE: 60 MMHG | BODY MASS INDEX: 35.11 KG/M2

## 2022-12-16 LAB
C. TRACHOMATIS, EXTERNAL RESULT: NEGATIVE
N. GONORRHOEAE, EXTERNAL RESULT: NEGATIVE

## 2022-12-16 PROCEDURE — G0378 HOSPITAL OBSERVATION PER HR: HCPCS

## 2022-12-16 NOTE — DISCHARGE INSTRUCTIONS
Extreme Nausea and Vomiting in Pregnancy: Care Instructions  Your Care Instructions     Nausea and vomiting (often called morning sickness) are common in pregnancy. They are caused by pregnancy hormones and happen most often in the first 3 months. Some women get very sick and are not able to keep down food and fluids. This extreme morning sickness is called hyperemesis gravidarum. It can lead to a dangerous loss of fluids in the body. It also can keep you from gaining weight and getting proper nutrition during your pregnancy. Your body fluids are put back in balance with water and minerals called electrolytes. Medicine may help if you have severe nausea and vomiting. Follow-up care is a key part of your treatment and safety. Be sure to make and go to all appointments, and call your doctor if you are having problems. It's also a good idea to know your test results and keep a list of the medicines you take. How can you care for yourself at home? Take your medicines exactly as prescribed. Call your doctor if you think you are having a problem with your medicine. Drink plenty of fluids to prevent dehydration. Choose water and other clear liquids until you feel better. Try sipping on sports drinks that have salt and sugar in them. Eat a small snack, such as crackers, before you get out of bed. Wait a few minutes, then get out of bed slowly. Keep food in your stomach, but not too much at once. An empty stomach can make nausea worse. Eat several small meals every day instead of three large meals. Eat more protein and less fat. Get plenty of vitamin B6 by eating whole grains, nuts, seeds, and legumes. You can take vitamin B6 tablets if your doctor says it is okay. Try to avoid smells and foods that make you feel sick to your stomach. Get lots of rest.  You may want to try acupressure bands. They put pressure on an acupressure point in the wrist. Some women feel better using the bands.   Karen may also help you feel better. You can use it in tea, take it as a pill, or use a jimi syrup that you can buy at a health food store. When should you call for help? Call 911 anytime you think you may need emergency care. For example, call if:    You passed out (lost consciousness). Call your doctor now or seek immediate medical care if:    You are sick to your stomach or cannot drink fluids. You have symptoms of dehydration, such as:  Dry eyes and a dry mouth. Passing only a little urine. Feeling thirstier than usual.     You are not able to keep down your medicines. You have pain in your belly or pelvis. Watch closely for changes in your health, and be sure to contact your doctor if:    You do not get better as expected. Where can you learn more? Go to http://www.gray.com/  Enter T466 in the search box to learn more about \"Extreme Nausea and Vomiting in Pregnancy: Care Instructions. \"  Current as of: February 23, 2022               Content Version: 13.4  © 2006-2022 [employer]. Care instructions adapted under license by [employer] (which disclaims liability or warranty for this information). If you have questions about a medical condition or this instruction, always ask your healthcare professional. [place] [address] any warranty or liability for your use of this information.

## 2022-12-16 NOTE — PROGRESS NOTES
High Risk Obstetrics Progress Note    Name: Wali Bergeron MRN: [id number]  SSN: [id number]    YOB: 2000  Age: 20 y.o. Sex: female      Subjective:      LOS: 0 days    Estimated Date of Delivery: 23   Gestational Age Today: 12w0d     Patient admitted for hyperemesis gravidarum, patient hasnt vomited since the day before yesterday. Objective:     Vitals:  Blood pressure (!) 95/55, pulse 62, temperature 98.4 °F (36.9 °C), resp. rate 14, height 5' 5\" (1.651 m), SpO2 95 %. Temp (24hrs), Av.4 °F (36.9 °C), Min:98.3 °F (36.8 °C), Max:98.4 °F (10.4 °C)    Systolic (74COW), QOU:800 , Min:95 , YGY:064      Diastolic (49OJM), NVT:78, Min:55, Max:69       Intake and Output:         Physical Exam:  Deferred             Labs: No results found for this or any previous visit (from the past 36 hour(s)). Assessment and Plan:       Active Problems:    Hyperemesis (2022)       Hyperemesis Gravidarum:  Antiemetic Therapy including Phenergan/Zofran/Reglan/Zantac  Patient tolerating oral medications and food, will discharge home to follow up in office in 3-5 days

## 2022-12-16 NOTE — ROUTINE PROCESS
0900 Patient given verbal and written copy of discharge instructions. Signature page signed by patient and responsible party then placed in medical records. Patient given opportunity to ask questions and verbalized understanding of instructions. All questions answered. No distress noted. Call made to office to  new medications and appointment for 3-5 days.

## 2022-12-16 NOTE — ROUTINE PROCESS
Bedside and Verbal shift change report given to YAMILETH Billings RN  (oncoming nurse) by KRYSTYNA Bradshaw (offgoing nurse). Report included the following information SBAR, Kardex, Procedure Summary, Intake/Output, MAR, and Recent Results.

## 2022-12-16 NOTE — PROGRESS NOTES
Problem: Falls - Risk of  Goal: *Absence of Falls  Description: Document Giuseppe Fam Fall Risk and appropriate interventions in the flowsheet.   Outcome: Progressing Towards Goal  Note: Fall Risk Interventions:            Medication Interventions: Teach patient to arise slowly

## 2023-01-03 NOTE — ADT AUTH CERT NOTES
PREVIOUSLY DENIED FOR INPATIENT DOWNGRADED TO OBSERVATION  IP REF # [id number]   DATES OF SERVICE 12/2-12/3/22     PLEASE FAX FORM OR CALL BACK  AUTHORIZATION FOR OBSERVATION   PHONE # [phone] FAX # [phone]

## 2023-06-03 LAB — GBS, EXTERNAL RESULT: POSITIVE

## 2023-06-08 ENCOUNTER — HOSPITAL ENCOUNTER (OUTPATIENT)
Facility: HOSPITAL | Age: 23
Discharge: HOME | End: 2023-06-08
Attending: SPECIALIST | Admitting: SPECIALIST

## 2023-06-08 VITALS
HEART RATE: 84 BPM | BODY MASS INDEX: 35.99 KG/M2 | RESPIRATION RATE: 15 BRPM | HEIGHT: 65 IN | DIASTOLIC BLOOD PRESSURE: 77 MMHG | SYSTOLIC BLOOD PRESSURE: 124 MMHG | OXYGEN SATURATION: 94 % | WEIGHT: 216 LBS | TEMPERATURE: 98.4 F

## 2023-06-08 PROBLEM — Z34.80 PREGNANCY IN MULTIGRAVIDA: Status: ACTIVE | Noted: 2023-06-08

## 2023-06-08 PROCEDURE — 59025 FETAL NON-STRESS TEST: CPT | Performed by: SPECIALIST

## 2023-06-08 PROCEDURE — 96360 HYDRATION IV INFUSION INIT: CPT | Performed by: SPECIALIST

## 2023-06-08 PROCEDURE — 6370000000 HC RX 637 (ALT 250 FOR IP): Performed by: SPECIALIST

## 2023-06-08 PROCEDURE — 2580000003 HC RX 258: Performed by: MIDWIFE

## 2023-06-08 PROCEDURE — 99282 EMERGENCY DEPT VISIT SF MDM: CPT

## 2023-06-08 PROCEDURE — 99285 EMERGENCY DEPT VISIT HI MDM: CPT

## 2023-06-08 RX ORDER — SODIUM CHLORIDE, SODIUM LACTATE, POTASSIUM CHLORIDE, CALCIUM CHLORIDE 600; 310; 30; 20 MG/100ML; MG/100ML; MG/100ML; MG/100ML
INJECTION, SOLUTION INTRAVENOUS CONTINUOUS
Status: DISCONTINUED | OUTPATIENT
Start: 2023-06-08 | End: 2023-06-08

## 2023-06-08 RX ORDER — OXYCODONE HYDROCHLORIDE AND ACETAMINOPHEN 5; 325 MG/1; MG/1
1 TABLET ORAL EVERY 4 HOURS PRN
Status: DISCONTINUED | OUTPATIENT
Start: 2023-06-08 | End: 2023-06-09 | Stop reason: HOSPADM

## 2023-06-08 RX ORDER — SODIUM CHLORIDE, SODIUM LACTATE, POTASSIUM CHLORIDE, AND CALCIUM CHLORIDE .6; .31; .03; .02 G/100ML; G/100ML; G/100ML; G/100ML
500 INJECTION, SOLUTION INTRAVENOUS ONCE
Status: COMPLETED | OUTPATIENT
Start: 2023-06-08 | End: 2023-06-08

## 2023-06-08 RX ADMIN — OXYCODONE HYDROCHLORIDE AND ACETAMINOPHEN 1 TABLET: 5; 325 TABLET ORAL at 17:38

## 2023-06-08 RX ADMIN — SODIUM CHLORIDE, SODIUM LACTATE, POTASSIUM CHLORIDE, AND CALCIUM CHLORIDE 500 ML: 600; 310; 30; 20 INJECTION, SOLUTION INTRAVENOUS at 16:52

## 2023-06-08 NOTE — H&P
Patient seen s/p fall this am on abd  VS: Satble   LUNGS:CTA  CVS: WNL  ABD SOFT, + BS, No rebound  EXTREM: WNL  P/E: 1cm/60/vtx/-1  NST: + irreg contrac  Plan: Hydration, Pain mgt  All Q's answered and verbalized understanding

## 2023-06-08 NOTE — PROGRESS NOTES
1920: Bedside and Verbal shift change report given to Sana Piper RN (oncoming nurse) by Bethany Lopez RN (offgoing nurse). Report included the following information Nurse Handoff Report, Index, Intake/Output, MAR, Recent Results, and Med Rec Status. 2050: spoke with Dr Kole Collier regarding POC. Plan to reassess contraction pattern and pain at 2200. Possible d/c home     2205: Dr Kole Collier at bedside to discuss POC. Strip reviewed. Plan to discharge home with office follow up       [phone]: discharge instructions reviewed with patient. All questions answered.  Pt ambulated off unit in stable condition

## 2023-06-08 NOTE — PROGRESS NOTES
1614: Tuan Lewis is a 24 y.o. @ at 36w6d patient of Dr Violet Marte at [facility] presents to L&D triage with c/o abdominal pain and decreased fetal movement after fall onto abdomen. She reports Negative FM, denies vaginal bleeding. She also denies Headaches, RUQ pain, and vision changes. EFM and toco placed for initial assessment     1650: Called Dr. Nelsy Barriga and updated him that patient was severiano every 3-4 mins. He ordered a 500 cc bolus. 1705: Dr. Nelsy Barriga at the bedside to assess patient. Percocet ordered and plans to continue to monitor.

## 2023-06-08 NOTE — PROGRESS NOTES
1845: Per , pt moved to room for observation over night. Pt on continuous monitor for 2-3 hours and if contractions better, may be off continuous. May have a regular diet and percocet Q4h prn.

## 2023-06-09 NOTE — DISCHARGE SUMMARY
Physician Discharge Summary    Patient ID:  Nelwyn Leventhal  [id number]  79 y.o.  2000    Admit date: 6/8/2023    Discharge date and time:  6/8/2023    Admitting Physician: Christine Desai MD     Discharge Physician: Margo Yne MD    Admission Diagnoses: Pregnancy in multigravida [Z34.80]                                         S/P fall    Admission Condition: good    Discharged Condition: good    Hospital Course: 24 y.o. Víctor Emerson at 36w6d  s/p fall to abdomen today with abdominal pain. Monitored x >4 hours with reassuring FHR pattern and no regular uterine contractions. Desired D/C to home. Labor and Fetal motion counts discussed. Discharge Exam:  /77   Pulse 84   Temp 98.4 °F (36.9 °C) (Oral)   Resp 15   Ht 1.651 m (5' 5\")   Wt 98 kg (216 lb)   SpO2 94%   BMI 35.94 kg/m² .   General Appearance:  Alert, cooperative, no distress, appropriate for age               Abdomen:  Soft, non-tender,  No E/I/D at incision                                                        Disposition: Home    Final Diagnosis:   S/P fall                                 38 weeks gestation    Procedures/Operations: none    Patient Instructions:   [unfilled]  Activity: activity as tolerated    Diet: regular diet    Follow-up with Seth HERNANDEZ next Monday in     Margo Yen MD

## 2023-06-09 NOTE — DISCHARGE INSTRUCTIONS
chances of quitting for good. Do not drink alcohol or use marijuana or illegal drugs. Follow your doctor's directions about activity. Your doctor will let you know how much, if any, exercise you can do. Ask your doctor if you can have sex. If you are at risk for early labor, your doctor may ask you to not have sex. Take care to prevent falls. During pregnancy, your joints are loose, and your balance is off. Sports such as bicycling, skiing, or in-line skating can increase your risk of falling. And don't ride horses or motorcycles, dive, water ski, scuba dive, or parachute jump while you are pregnant. Avoid things that can make your body too hot and may be harmful to your baby, such as a hot tub or sauna. Or talk with your doctor before doing anything that raises your body temperature. Your doctor can tell you if it's safe. Do not take any over-the-counter or herbal medicines or supplements without talking to your doctor or pharmacist first.  When should you call for help? Call 911  anytime you think you may need emergency care. For example, call if:    You passed out (lost consciousness). You have a seizure. You have severe vaginal bleeding. You have severe pain in your belly or pelvis. You have had fluid gushing or leaking from your vagina and you know or think the umbilical cord is bulging into your vagina. If this happens, immediately get down on your knees so your rear end (buttocks) is higher than your head. This will decrease the pressure on the cord until help arrives. Call your doctor now or seek immediate medical care if:    You have signs of preeclampsia, such as:  Sudden swelling of your face, hands, or feet. New vision problems (such as dimness, blurring, or seeing spots). A severe headache. You have any vaginal bleeding. You have belly pain or cramping. You have a fever. You have had regular contractions (with or without pain) for an hour.  This means that you

## 2023-06-22 ENCOUNTER — HOSPITAL ENCOUNTER (INPATIENT)
Facility: HOSPITAL | Age: 23
LOS: 2 days | Discharge: HOME | End: 2023-06-24
Attending: SPECIALIST | Admitting: SPECIALIST
Payer: COMMERCIAL

## 2023-06-22 ENCOUNTER — APPOINTMENT (OUTPATIENT)
Facility: HOSPITAL | Age: 23
End: 2023-06-22
Payer: COMMERCIAL

## 2023-06-22 LAB
ABO + RH BLD: NORMAL
AMPHET UR QL SCN: NEGATIVE
BARBITURATES UR QL SCN: POSITIVE
BASOPHILS # BLD: 0 K/UL (ref 0–0.1)
BASOPHILS NFR BLD: 0 % (ref 0–1)
BENZODIAZ UR QL: NEGATIVE
BLOOD GROUP ANTIBODIES SERPL: NORMAL
CANNABINOIDS UR QL SCN: NEGATIVE
COCAINE UR QL SCN: NEGATIVE
DIFFERENTIAL METHOD BLD: ABNORMAL
EOSINOPHIL # BLD: 0.1 K/UL (ref 0–0.4)
EOSINOPHIL NFR BLD: 0 % (ref 0–7)
ERYTHROCYTE [DISTWIDTH] IN BLOOD BY AUTOMATED COUNT: 13.7 % (ref 11.5–14.5)
HCT VFR BLD AUTO: 34.3 % (ref 35–47)
HGB BLD-MCNC: 11.2 G/DL (ref 11.5–16)
IMM GRANULOCYTES # BLD AUTO: 0.1 K/UL (ref 0–0.04)
IMM GRANULOCYTES NFR BLD AUTO: 1 % (ref 0–0.5)
LYMPHOCYTES # BLD: 2.1 K/UL (ref 0.8–3.5)
LYMPHOCYTES NFR BLD: 13 % (ref 12–49)
Lab: ABNORMAL
MCH RBC QN AUTO: 29.9 PG (ref 26–34)
MCHC RBC AUTO-ENTMCNC: 32.7 G/DL (ref 30–36.5)
MCV RBC AUTO: 91.5 FL (ref 80–99)
METHADONE UR QL: NEGATIVE
MONOCYTES # BLD: 1.2 K/UL (ref 0–1)
MONOCYTES NFR BLD: 8 % (ref 5–13)
NEUTS SEG # BLD: 12.5 K/UL (ref 1.8–8)
NEUTS SEG NFR BLD: 78 % (ref 32–75)
NRBC # BLD: 0 K/UL (ref 0–0.01)
NRBC BLD-RTO: 0 PER 100 WBC
OPIATES UR QL: NEGATIVE
PCP UR QL: NEGATIVE
PLATELET # BLD AUTO: 275 K/UL (ref 150–400)
PMV BLD AUTO: 9.3 FL (ref 8.9–12.9)
RBC # BLD AUTO: 3.75 M/UL (ref 3.8–5.2)
SPECIMEN EXP DATE BLD: NORMAL
WBC # BLD AUTO: 16 K/UL (ref 3.6–11)

## 2023-06-22 PROCEDURE — 86900 BLOOD TYPING SEROLOGIC ABO: CPT

## 2023-06-22 PROCEDURE — 36415 COLL VENOUS BLD VENIPUNCTURE: CPT

## 2023-06-22 PROCEDURE — 86850 RBC ANTIBODY SCREEN: CPT

## 2023-06-22 PROCEDURE — 80307 DRUG TEST PRSMV CHEM ANLYZR: CPT

## 2023-06-22 PROCEDURE — 85025 COMPLETE CBC W/AUTO DIFF WBC: CPT

## 2023-06-22 PROCEDURE — 86901 BLOOD TYPING SEROLOGIC RH(D): CPT

## 2023-06-22 PROCEDURE — 1120000000 HC RM PRIVATE OB

## 2023-06-22 PROCEDURE — 7210000100 HC LABOR FEE PER 1 HR

## 2023-06-22 RX ORDER — SODIUM CHLORIDE 0.9 % (FLUSH) 0.9 %
5-40 SYRINGE (ML) INJECTION EVERY 12 HOURS SCHEDULED
Status: DISCONTINUED | OUTPATIENT
Start: 2023-06-22 | End: 2023-06-23

## 2023-06-22 RX ORDER — METHYLERGONOVINE MALEATE 0.2 MG/ML
200 INJECTION INTRAVENOUS PRN
Status: DISCONTINUED | OUTPATIENT
Start: 2023-06-22 | End: 2023-06-23

## 2023-06-22 RX ORDER — NALBUPHINE HYDROCHLORIDE 10 MG/ML
5 INJECTION, SOLUTION INTRAMUSCULAR; INTRAVENOUS; SUBCUTANEOUS
Status: DISCONTINUED | OUTPATIENT
Start: 2023-06-22 | End: 2023-06-23 | Stop reason: RX

## 2023-06-22 RX ORDER — SODIUM CHLORIDE, SODIUM LACTATE, POTASSIUM CHLORIDE, AND CALCIUM CHLORIDE .6; .31; .03; .02 G/100ML; G/100ML; G/100ML; G/100ML
1000 INJECTION, SOLUTION INTRAVENOUS PRN
Status: DISCONTINUED | OUTPATIENT
Start: 2023-06-22 | End: 2023-06-23

## 2023-06-22 RX ORDER — CARBOPROST TROMETHAMINE 250 UG/ML
250 INJECTION, SOLUTION INTRAMUSCULAR PRN
Status: DISCONTINUED | OUTPATIENT
Start: 2023-06-22 | End: 2023-06-23

## 2023-06-22 RX ORDER — ONDANSETRON 2 MG/ML
4 INJECTION INTRAMUSCULAR; INTRAVENOUS EVERY 6 HOURS PRN
Status: DISCONTINUED | OUTPATIENT
Start: 2023-06-22 | End: 2023-06-23 | Stop reason: SDUPTHER

## 2023-06-22 RX ORDER — SODIUM CHLORIDE, SODIUM LACTATE, POTASSIUM CHLORIDE, AND CALCIUM CHLORIDE .6; .31; .03; .02 G/100ML; G/100ML; G/100ML; G/100ML
500 INJECTION, SOLUTION INTRAVENOUS PRN
Status: DISCONTINUED | OUTPATIENT
Start: 2023-06-22 | End: 2023-06-23

## 2023-06-22 RX ORDER — LIDOCAINE HYDROCHLORIDE 10 MG/ML
30 INJECTION, SOLUTION EPIDURAL; INFILTRATION; INTRACAUDAL; PERINEURAL PRN
Status: DISCONTINUED | OUTPATIENT
Start: 2023-06-22 | End: 2023-06-23

## 2023-06-22 RX ORDER — ZOLPIDEM TARTRATE 5 MG/1
5 TABLET ORAL NIGHTLY PRN
Status: DISCONTINUED | OUTPATIENT
Start: 2023-06-22 | End: 2023-06-23

## 2023-06-22 RX ORDER — MISOPROSTOL 200 UG/1
800 TABLET ORAL PRN
Status: DISCONTINUED | OUTPATIENT
Start: 2023-06-22 | End: 2023-06-23

## 2023-06-22 RX ORDER — SODIUM CHLORIDE 0.9 % (FLUSH) 0.9 %
5-40 SYRINGE (ML) INJECTION PRN
Status: DISCONTINUED | OUTPATIENT
Start: 2023-06-22 | End: 2023-06-23

## 2023-06-22 RX ORDER — SODIUM CHLORIDE 9 MG/ML
25 INJECTION, SOLUTION INTRAVENOUS PRN
Status: DISCONTINUED | OUTPATIENT
Start: 2023-06-22 | End: 2023-06-23

## 2023-06-22 RX ORDER — DIPHENHYDRAMINE HYDROCHLORIDE 50 MG/ML
25 INJECTION INTRAMUSCULAR; INTRAVENOUS EVERY 4 HOURS PRN
Status: DISCONTINUED | OUTPATIENT
Start: 2023-06-22 | End: 2023-06-23

## 2023-06-22 RX ORDER — SODIUM CHLORIDE, SODIUM LACTATE, POTASSIUM CHLORIDE, CALCIUM CHLORIDE 600; 310; 30; 20 MG/100ML; MG/100ML; MG/100ML; MG/100ML
INJECTION, SOLUTION INTRAVENOUS CONTINUOUS
Status: DISCONTINUED | OUTPATIENT
Start: 2023-06-23 | End: 2023-06-23

## 2023-06-22 RX ORDER — ACETAMINOPHEN 325 MG/1
650 TABLET ORAL EVERY 4 HOURS PRN
Status: DISCONTINUED | OUTPATIENT
Start: 2023-06-22 | End: 2023-06-23

## 2023-06-22 NOTE — H&P
History & Physical    Name: Diana Whitt MRN: [id number]  SSN: [id number]    YOB: 2000  Age: 24 y.o. Sex: female      Subjective:     Estimated Date of Delivery: 23  OB History    Para Term  AB Living   4   1   1 1   SAB IAB Ectopic Molar Multiple Live Births                    # Outcome Date GA Lbr Juventino/2nd Weight Sex Delivery Anes PTL Lv   1 Current                Ms. Milad Broussard is admitted with pregnancy at 38w6d for induction of labor due to favorable cervix at term. Prenatal course was complicated by hyperemesis gravidarum. Please see prenatal records for details. Past Medical History:   Diagnosis Date    Abnormal Papanicolaou smear of cervix     Other ill-defined conditions(279.86)     Pt has had several episodes of postural hypotention    POTS (postural orthostatic tachycardia syndrome)     Thyroid activity decreased      Past Surgical History:   Procedure Laterality Date    OTHER SURGICAL HISTORY       Social History     Occupational History    Not on file   Tobacco Use    Smoking status: Never    Smokeless tobacco: Never   Substance and Sexual Activity    Alcohol use: No    Drug use: No    Sexual activity: Not on file     Family History   Problem Relation Age of Onset    Asthma Mother     Headache Maternal Aunt     Headache Mother     Psychiatric Disorder Maternal Aunt     Asthma Brother     Psychiatric Disorder Brother     Asthma Sister     Psychiatric Disorder Sister     Hypertension Mother     Psychiatric Disorder Mother     Hypertension Maternal Grandfather     Diabetes Maternal Grandmother     Hypertension Maternal Grandmother     Cancer Maternal Grandmother     Hypertension Maternal Uncle     Hypertension Maternal Aunt        No Known Allergies  Prior to Admission medications    Medication Sig Start Date End Date Taking?  Authorizing Provider   Prenatal Vit-Fe Fumarate-FA (PRENATAL 1+1 PO) Take by mouth    Historical Provider, MD   sertraline (ZOLOFT) 50 MG tablet

## 2023-06-22 NOTE — LACTATION NOTE
Discussed with mother her plan for feeding. Reviewed the benefits of exclusive breast milk feeding during the hospital stay. Informed mother of the risks of using formula to supplement in the first few days of life as well as the benefits of successful breast milk feeding. Mother acknowledges understanding of information reviewed and states that it is her plan to breast milk feed exclusively her infant. Will support her choice and offer additional information as needed. 29-Oct-2019

## 2023-06-23 ENCOUNTER — ANESTHESIA (OUTPATIENT)
Dept: LABOR AND DELIVERY | Facility: HOSPITAL | Age: 23
End: 2023-06-23
Payer: COMMERCIAL

## 2023-06-23 ENCOUNTER — ANESTHESIA EVENT (OUTPATIENT)
Dept: LABOR AND DELIVERY | Facility: HOSPITAL | Age: 23
End: 2023-06-23
Payer: COMMERCIAL

## 2023-06-23 PROCEDURE — 3700000156 HC EPIDURAL ANESTHESIA

## 2023-06-23 PROCEDURE — 6360000002 HC RX W HCPCS: Performed by: MIDWIFE

## 2023-06-23 PROCEDURE — 2500000003 HC RX 250 WO HCPCS: Performed by: STUDENT IN AN ORGANIZED HEALTH CARE EDUCATION/TRAINING PROGRAM

## 2023-06-23 PROCEDURE — 00HU33Z INSERTION OF INFUSION DEVICE INTO SPINAL CANAL, PERCUTANEOUS APPROACH: ICD-10-PCS | Performed by: STUDENT IN AN ORGANIZED HEALTH CARE EDUCATION/TRAINING PROGRAM

## 2023-06-23 PROCEDURE — 6370000000 HC RX 637 (ALT 250 FOR IP): Performed by: SPECIALIST

## 2023-06-23 PROCEDURE — 7100000001 HC PACU RECOVERY - ADDTL 15 MIN

## 2023-06-23 PROCEDURE — 1120000000 HC RM PRIVATE OB

## 2023-06-23 PROCEDURE — 51702 INSERT TEMP BLADDER CATH: CPT

## 2023-06-23 PROCEDURE — 6370000000 HC RX 637 (ALT 250 FOR IP): Performed by: MIDWIFE

## 2023-06-23 PROCEDURE — 7210000100 HC LABOR FEE PER 1 HR

## 2023-06-23 PROCEDURE — 7100000000 HC PACU RECOVERY - FIRST 15 MIN

## 2023-06-23 PROCEDURE — 7220000101 HC DELIVERY VAGINAL/SINGLE

## 2023-06-23 PROCEDURE — 6360000002 HC RX W HCPCS: Performed by: STUDENT IN AN ORGANIZED HEALTH CARE EDUCATION/TRAINING PROGRAM

## 2023-06-23 PROCEDURE — 2580000003 HC RX 258: Performed by: MIDWIFE

## 2023-06-23 RX ORDER — SODIUM CHLORIDE, SODIUM LACTATE, POTASSIUM CHLORIDE, CALCIUM CHLORIDE 600; 310; 30; 20 MG/100ML; MG/100ML; MG/100ML; MG/100ML
INJECTION, SOLUTION INTRAVENOUS CONTINUOUS
Status: DISCONTINUED | OUTPATIENT
Start: 2023-06-23 | End: 2023-06-24

## 2023-06-23 RX ORDER — SODIUM CHLORIDE 0.9 % (FLUSH) 0.9 %
5-40 SYRINGE (ML) INJECTION EVERY 12 HOURS SCHEDULED
Status: DISCONTINUED | OUTPATIENT
Start: 2023-06-23 | End: 2023-06-24

## 2023-06-23 RX ORDER — ACETAMINOPHEN 325 MG/1
650 TABLET ORAL EVERY 4 HOURS PRN
Status: DISCONTINUED | OUTPATIENT
Start: 2023-06-23 | End: 2023-06-24 | Stop reason: HOSPADM

## 2023-06-23 RX ORDER — SODIUM CHLORIDE 9 MG/ML
INJECTION, SOLUTION INTRAVENOUS PRN
Status: DISCONTINUED | OUTPATIENT
Start: 2023-06-23 | End: 2023-06-24

## 2023-06-23 RX ORDER — BUPIVACAINE HYDROCHLORIDE AND EPINEPHRINE 2.5; 5 MG/ML; UG/ML
INJECTION, SOLUTION EPIDURAL; INFILTRATION; INTRACAUDAL; PERINEURAL PRN
Status: DISCONTINUED | OUTPATIENT
Start: 2023-06-23 | End: 2023-06-23 | Stop reason: SDUPTHER

## 2023-06-23 RX ORDER — NALOXONE HYDROCHLORIDE 0.4 MG/ML
INJECTION, SOLUTION INTRAMUSCULAR; INTRAVENOUS; SUBCUTANEOUS PRN
Status: DISCONTINUED | OUTPATIENT
Start: 2023-06-23 | End: 2023-06-23

## 2023-06-23 RX ORDER — FAMOTIDINE 20 MG/1
20 TABLET, FILM COATED ORAL 2 TIMES DAILY
Status: DISCONTINUED | OUTPATIENT
Start: 2023-06-23 | End: 2023-06-24 | Stop reason: HOSPADM

## 2023-06-23 RX ORDER — BUPIVACAINE HYDROCHLORIDE AND EPINEPHRINE 2.5; 5 MG/ML; UG/ML
INJECTION, SOLUTION EPIDURAL; INFILTRATION; INTRACAUDAL; PERINEURAL
Status: COMPLETED
Start: 2023-06-23 | End: 2023-06-23

## 2023-06-23 RX ORDER — ONDANSETRON 4 MG/1
8 TABLET, ORALLY DISINTEGRATING ORAL EVERY 8 HOURS PRN
Status: DISCONTINUED | OUTPATIENT
Start: 2023-06-23 | End: 2023-06-24 | Stop reason: HOSPADM

## 2023-06-23 RX ORDER — PROCHLORPERAZINE EDISYLATE 5 MG/ML
10 INJECTION INTRAMUSCULAR; INTRAVENOUS ONCE
Status: DISCONTINUED | OUTPATIENT
Start: 2023-06-23 | End: 2023-06-23

## 2023-06-23 RX ORDER — OXYCODONE HYDROCHLORIDE 5 MG/1
5 TABLET ORAL EVERY 6 HOURS PRN
Status: DISCONTINUED | OUTPATIENT
Start: 2023-06-23 | End: 2023-06-24 | Stop reason: HOSPADM

## 2023-06-23 RX ORDER — DOCUSATE SODIUM 100 MG/1
100 CAPSULE, LIQUID FILLED ORAL 2 TIMES DAILY
Status: DISCONTINUED | OUTPATIENT
Start: 2023-06-23 | End: 2023-06-24 | Stop reason: HOSPADM

## 2023-06-23 RX ORDER — SODIUM CHLORIDE 0.9 % (FLUSH) 0.9 %
5-40 SYRINGE (ML) INJECTION PRN
Status: DISCONTINUED | OUTPATIENT
Start: 2023-06-23 | End: 2023-06-24 | Stop reason: HOSPADM

## 2023-06-23 RX ORDER — MEPERIDINE HYDROCHLORIDE 25 MG/ML
50 INJECTION INTRAMUSCULAR; INTRAVENOUS; SUBCUTANEOUS EVERY 4 HOURS PRN
Status: DISCONTINUED | OUTPATIENT
Start: 2023-06-23 | End: 2023-06-23

## 2023-06-23 RX ORDER — LANOLIN/MINERAL OIL
LOTION (ML) TOPICAL PRN
Status: DISCONTINUED | OUTPATIENT
Start: 2023-06-23 | End: 2023-06-24 | Stop reason: HOSPADM

## 2023-06-23 RX ORDER — MEPERIDINE HYDROCHLORIDE 50 MG/ML
50 INJECTION INTRAMUSCULAR; INTRAVENOUS; SUBCUTANEOUS EVERY 4 HOURS PRN
Status: DISCONTINUED | OUTPATIENT
Start: 2023-06-23 | End: 2023-06-23

## 2023-06-23 RX ORDER — IBUPROFEN 600 MG/1
600 TABLET ORAL EVERY 8 HOURS SCHEDULED
Status: DISCONTINUED | OUTPATIENT
Start: 2023-06-23 | End: 2023-06-24 | Stop reason: HOSPADM

## 2023-06-23 RX ORDER — ONDANSETRON 2 MG/ML
4 INJECTION INTRAMUSCULAR; INTRAVENOUS EVERY 6 HOURS PRN
Status: DISCONTINUED | OUTPATIENT
Start: 2023-06-23 | End: 2023-06-23

## 2023-06-23 RX ORDER — FENTANYL 0.2 MG/100ML-BUPIV 0.125%-NACL 0.9% EPIDURAL INJ 2/0.125 MCG/ML-%
12 SOLUTION INJECTION CONTINUOUS
Status: DISCONTINUED | OUTPATIENT
Start: 2023-06-23 | End: 2023-06-23

## 2023-06-23 RX ADMIN — ZOLPIDEM TARTRATE 5 MG: 5 TABLET ORAL at 00:10

## 2023-06-23 RX ADMIN — SODIUM CHLORIDE 5 MILLION UNITS: 900 INJECTION INTRAVENOUS at 00:16

## 2023-06-23 RX ADMIN — Medication 25 MCG: at 04:26

## 2023-06-23 RX ADMIN — ONDANSETRON 4 MG: 2 INJECTION INTRAMUSCULAR; INTRAVENOUS at 12:27

## 2023-06-23 RX ADMIN — SODIUM CHLORIDE 2.5 MILLION UNITS: 9 INJECTION, SOLUTION INTRAVENOUS at 12:29

## 2023-06-23 RX ADMIN — BUPIVACAINE HYDROCHLORIDE AND EPINEPHRINE 3 ML: 2.5; 5 INJECTION, SOLUTION EPIDURAL; INFILTRATION; INTRACAUDAL; PERINEURAL at 11:54

## 2023-06-23 RX ADMIN — SODIUM CHLORIDE 2.5 MILLION UNITS: 9 INJECTION, SOLUTION INTRAVENOUS at 04:26

## 2023-06-23 RX ADMIN — SODIUM CHLORIDE 2.5 MILLION UNITS: 9 INJECTION, SOLUTION INTRAVENOUS at 08:25

## 2023-06-23 RX ADMIN — ACETAMINOPHEN 650 MG: 325 TABLET ORAL at 23:00

## 2023-06-23 RX ADMIN — SODIUM CHLORIDE, POTASSIUM CHLORIDE, SODIUM LACTATE AND CALCIUM CHLORIDE: 600; 310; 30; 20 INJECTION, SOLUTION INTRAVENOUS at 10:27

## 2023-06-23 RX ADMIN — Medication 166.7 ML: at 14:17

## 2023-06-23 RX ADMIN — DOCUSATE SODIUM 100 MG: 100 CAPSULE, LIQUID FILLED ORAL at 20:45

## 2023-06-23 RX ADMIN — FAMOTIDINE 20 MG: 20 TABLET, FILM COATED ORAL at 20:45

## 2023-06-23 RX ADMIN — IBUPROFEN 600 MG: 600 TABLET, FILM COATED ORAL at 16:31

## 2023-06-23 RX ADMIN — SERTRALINE 50 MG: 50 TABLET, FILM COATED ORAL at 20:45

## 2023-06-23 RX ADMIN — BUPIVACAINE HYDROCHLORIDE AND EPINEPHRINE 3 ML: 2.5; 5 INJECTION, SOLUTION EPIDURAL; INFILTRATION; INTRACAUDAL; PERINEURAL at 11:51

## 2023-06-23 RX ADMIN — OXYTOCIN 87.3 MILLI-UNITS/MIN: 10 INJECTION INTRAVENOUS at 14:30

## 2023-06-23 RX ADMIN — OXYTOCIN 1 MILLI-UNITS/MIN: 10 INJECTION INTRAVENOUS at 08:25

## 2023-06-23 RX ADMIN — SODIUM CHLORIDE, PRESERVATIVE FREE 10 ML: 5 INJECTION INTRAVENOUS at 04:26

## 2023-06-23 RX ADMIN — BUPIVACAINE HYDROCHLORIDE AND EPINEPHRINE 0.5 ML: 2.5; 5 INJECTION, SOLUTION EPIDURAL; INFILTRATION; INTRACAUDAL; PERINEURAL at 11:51

## 2023-06-23 RX ADMIN — Medication 25 MCG: at 00:14

## 2023-06-23 RX ADMIN — SODIUM CHLORIDE, POTASSIUM CHLORIDE, SODIUM LACTATE AND CALCIUM CHLORIDE: 600; 310; 30; 20 INJECTION, SOLUTION INTRAVENOUS at 00:14

## 2023-06-23 RX ADMIN — Medication 12 ML/HR: at 12:20

## 2023-06-23 ASSESSMENT — PAIN DESCRIPTION - LOCATION
LOCATION: ABDOMEN
LOCATION: BACK

## 2023-06-23 ASSESSMENT — PAIN - FUNCTIONAL ASSESSMENT: PAIN_FUNCTIONAL_ASSESSMENT: ACTIVITIES ARE NOT PREVENTED

## 2023-06-23 ASSESSMENT — PAIN DESCRIPTION - ORIENTATION
ORIENTATION: LOWER
ORIENTATION: LOWER

## 2023-06-23 ASSESSMENT — PAIN SCALES - GENERAL
PAINLEVEL_OUTOF10: 2
PAINLEVEL_OUTOF10: 3

## 2023-06-23 ASSESSMENT — PAIN DESCRIPTION - DESCRIPTORS
DESCRIPTORS: CRAMPING
DESCRIPTORS: ACHING

## 2023-06-23 NOTE — ANESTHESIA POSTPROCEDURE EVALUATION
Department of Anesthesiology  Postprocedure Note    Patient: Clara Khan  MRN: [id number]  YOB: 2000  Date of evaluation: 6/23/2023      Procedure Summary     Date: 06/23/23 Room / Location:     Anesthesia Start: 1140 Anesthesia Stop: 9844    Procedure: Labor Analgesia Diagnosis:     Scheduled Providers:  Responsible Provider: Jcarlos Manrique MD    Anesthesia Type: CSE ASA Status: 2          Anesthesia Type: No value filed.     Mark Phase I:      Mark Phase II:        Anesthesia Post Evaluation    Patient location during evaluation: PACU  Patient participation: complete - patient cannot participate  Level of consciousness: awake  Pain score: 0  Airway patency: patent  Nausea & Vomiting: no nausea and no vomiting  Complications: no  Cardiovascular status: hemodynamically stable  Respiratory status: acceptable  Hydration status: stable

## 2023-06-23 NOTE — ANESTHESIA PROCEDURE NOTES
CSE Block    Patient location during procedure: OB  Start time: 6/23/2023 11:40 AM  End time: 6/23/2023 11:55 AM  Reason for block: labor epidural  Staffing  Performed: anesthesiologist   Anesthesiologist: Isbaella Tang MD  CSE  Patient position: sitting  Prep: DuraPrep  Patient monitoring: continuous pulse ox and frequent blood pressure checks  Approach: midline  Provider prep: mask and sterile gloves  Spinal Needle  Needle type: pencil-tip   Needle gauge: 25 G  Needle length: 4.75 in  Epidural Needle  Injection technique: YANELI saline  Needle type: Tuohy   Needle gauge: 17 G  Needle length: 3.5 in  Needle insertion depth: 6.5 cm  Location: lumbar (1-5)  Catheter  Catheter type: end hole  Catheter size: 18 G  Catheter at skin depth: 10.5 cm  Test dose: negative  Assessment  Events: NoneT10  Hemodynamics: stable  Preanesthetic Checklist  Completed: patient identified, IV checked, site marked, risks and benefits discussed, surgical/procedural consents, equipment checked, pre-op evaluation, timeout performed, anesthesia consent given, oxygen available, monitors applied/VS acknowledged and fire risk safety assessment completed and verbalized

## 2023-06-23 NOTE — PROCEDURES
Delivery Note    Obstetrician:  Christine Quach MD    Assistant:  Gilson VALERA    Pre-Delivery Diagnosis: Induced labor    Post-Delivery Diagnosis: Male    Intrapartum Event: None    Procedure: Spontaneous vaginal delivery    Epidural: YES    Monitor:  Fetal Heart Tones - External and Uterine Contractions - Internal    Indications for instrumental delivery: none    Estimated Blood Loss: 200cc    Episiotomy: none    Laceration(s):  none    Laceration(s) repair: NO    Presentation: Cephalic    Fetal Description: harris    Fetal Position: Occiput Anterior      Apgar - One Minute: 8    Apgar - Five Minutes: 9    Umbilical Cord: 3 vessels present    Estimated Blood Loss: 200cc  Specimens: n/a             Complications:  none           Cord Blood Results:   Information for the patient's :  Philippe Hu [facility] [[id number]]   No results found for: PCTABR, PCTDIG, BILI   Prenatal Labs:   No results found for: PCTABR     Attending Attestation: I performed the procedure    Signed By:  Christine Quach MD     2023

## 2023-06-23 NOTE — ANESTHESIA PRE PROCEDURE
activity decreased        Past Surgical History:        Procedure Laterality Date    OTHER SURGICAL HISTORY         Social History:    Social History     Tobacco Use    Smoking status: Never    Smokeless tobacco: Never   Substance Use Topics    Alcohol use: No                                Counseling given: Not Answered      Vital Signs (Current):   Vitals:    06/23/23 0838 06/23/23 0842 06/23/23 0950 06/23/23 1105   BP: 131/76      Pulse: 74      Resp:       Temp:   98.2 °F (36.8 °C) 98.3 °F (36.8 °C)   TempSrc:   Oral Oral   SpO2:  98%     Weight:       Height:                                                  BP Readings from Last 3 Encounters:   06/23/23 131/76   06/08/23 124/77   11/16/15 97/41       NPO Status:                                                                                 BMI:   Wt Readings from Last 3 Encounters:   06/22/23 99.3 kg (219 lb)   06/08/23 98 kg (216 lb)   11/16/15 56.9 kg (125 lb 7.1 oz) (59 %, Z= 0.23)*     * Growth percentiles are based on CDC (Girls, 2-20 Years) data. Body mass index is 36.44 kg/m².     CBC:   Lab Results   Component Value Date/Time    WBC 16.0 06/22/2023 05:15 PM    RBC 3.75 06/22/2023 05:15 PM    HGB 11.2 06/22/2023 05:15 PM    HCT 34.3 06/22/2023 05:15 PM    MCV 91.5 06/22/2023 05:15 PM    RDW 13.7 06/22/2023 05:15 PM     06/22/2023 05:15 PM       CMP:   Lab Results   Component Value Date/Time     12/13/2022 03:53 PM    K 3.5 12/13/2022 03:53 PM     12/13/2022 03:53 PM    CO2 25 12/13/2022 03:53 PM    BUN 6 12/13/2022 03:53 PM    CREATININE 0.57 12/13/2022 03:53 PM    AGRATIO 1.1 12/13/2022 03:53 PM    GLUCOSE 79 12/13/2022 03:53 PM    PROT 6.5 12/13/2022 03:53 PM    CALCIUM 9.1 12/13/2022 03:53 PM    BILITOT 0.6 12/13/2022 03:53 PM    ALKPHOS 68 12/13/2022 03:53 PM    AST 12 12/13/2022 03:53 PM    ALT 20 12/13/2022 03:53 PM       POC Tests: No results for input(s): POCGLU, POCNA, POCK, POCCL, POCBUN, POCHEMO, POCHCT in the last

## 2023-06-24 VITALS
OXYGEN SATURATION: 98 % | WEIGHT: 219 LBS | RESPIRATION RATE: 17 BRPM | HEART RATE: 80 BPM | TEMPERATURE: 98.3 F | BODY MASS INDEX: 36.49 KG/M2 | HEIGHT: 65 IN | SYSTOLIC BLOOD PRESSURE: 123 MMHG | DIASTOLIC BLOOD PRESSURE: 77 MMHG

## 2023-06-24 PROCEDURE — 6370000000 HC RX 637 (ALT 250 FOR IP): Performed by: SPECIALIST

## 2023-06-24 RX ORDER — IBUPROFEN 600 MG/1
600 TABLET ORAL EVERY 8 HOURS SCHEDULED
Qty: 120 TABLET | Refills: 3 | Status: SHIPPED | OUTPATIENT
Start: 2023-06-24

## 2023-06-24 RX ADMIN — DOCUSATE SODIUM 100 MG: 100 CAPSULE, LIQUID FILLED ORAL at 10:10

## 2023-06-24 RX ADMIN — ACETAMINOPHEN 650 MG: 325 TABLET ORAL at 14:31

## 2023-06-24 RX ADMIN — FAMOTIDINE 20 MG: 20 TABLET, FILM COATED ORAL at 10:09

## 2023-06-24 RX ADMIN — IBUPROFEN 600 MG: 600 TABLET, FILM COATED ORAL at 00:38

## 2023-06-24 RX ADMIN — IBUPROFEN 600 MG: 600 TABLET, FILM COATED ORAL at 10:09

## 2023-06-24 RX ADMIN — ACETAMINOPHEN 650 MG: 325 TABLET ORAL at 04:25

## 2023-06-24 RX ADMIN — ACETAMINOPHEN 650 MG: 325 TABLET ORAL at 10:10

## 2023-06-24 ASSESSMENT — PAIN DESCRIPTION - ORIENTATION: ORIENTATION: LOWER

## 2023-06-24 ASSESSMENT — PAIN - FUNCTIONAL ASSESSMENT: PAIN_FUNCTIONAL_ASSESSMENT: ACTIVITIES ARE NOT PREVENTED

## 2023-06-24 ASSESSMENT — PAIN DESCRIPTION - LOCATION: LOCATION: ABDOMEN

## 2023-06-24 ASSESSMENT — PAIN SCALES - GENERAL: PAINLEVEL_OUTOF10: 2

## 2023-06-24 ASSESSMENT — PAIN DESCRIPTION - DESCRIPTORS: DESCRIPTORS: CRAMPING

## 2023-06-24 NOTE — LACTATION NOTE
23 1345   Visit Information   Lactation Consult Visit Type IP Initial Consult   Visit Length 15 minutes   Reason for Visit Normal  Visit;Education   Breast Feeding History/Assessment   Left Breast Soft  (Large)   Left Nipple Protrude   Right Nipple Protrude   Right Breast Soft  (Large)   Breastfeeding History Yes   [address] provided   Lactation Comment Experienced breastfeeding mother. States baby is breastfeeding well. Equipment: Spectra; Measured for 16-18mm flanges; Mother has smaller inserts  Received pacifier education  No pertinent maternal medications. Infant oral assessment: Chin slightly recessed     Reviewed the \"Your Guide to [employer]. Discussed the typical feeding characteristics in the 1st and 2nd DOL and signs of adequate intake. Discussed a feeding plan and mother's questions were addressed. Plan:  Offer lots of skin to skin and access to the breast.  Feed baby at early signs of hunger every 2-3 hours. Assure a deep latch, check that baby's lips are turned outward and use breast compression to keep baby actively feeding. Pump/hand express for poor feeds and offer baby EBM. Monitor wet and dirty diapers for signs of adequate intake.

## 2023-06-24 NOTE — DISCHARGE SUMMARY
Obstetrical Discharge Summary     Name: Ally Brown MRN: [id number]  SSN: [id number]    YOB: 2000  Age: 24 y.o. Sex: female      Allergies: Patient has no known allergies. Admit Date: 2023    Discharge Date: 2023     Admitting Physician: Ashley Chiang MD     Attending Physician:  Gege Gan MD     * Admission Diagnoses: Pregnancy in multigravida [Z34.80]    * Discharge Diagnoses:   Information for the patient's :  Thom Li [[id number]]   @[id number]@      Additional Diagnoses:    Lab Results   Component Value Date/Time    ABORH A POSITIVE 2023 05:15 PM    There is no immunization history for the selected administration types on file for this patient. * Procedures:   * No surgery found *           * Discharge Condition: [facility] Course: Normal hospital course following the delivery.     * Disposition: Home    Discharge Medications:      Medication List        START taking these medications      ibuprofen 600 MG tablet  Commonly known as: ADVIL;MOTRIN  Take 1 tablet by mouth every 8 hours            CONTINUE taking these medications      PRENATAL 1+1 PO     sertraline 50 MG tablet  Commonly known as: ZOLOFT               Where to Get Your Medications        These medications were sent to [address] [place] [address], [address] [place] [address]      Phone: [phone]   ibuprofen 600 MG tablet

## 2023-06-24 NOTE — PROGRESS NOTES
1545: Patient discharged. Discharge instructions and medications reviewed/given. Patient verbalizes understanding. All questions answered. No distress noted, patient stable. Signed copy of discharge instructions placed on paper chart. Patient confirmed will call to make appointment in 1 week with [facility]. [place]  Depression Screening Prior to Discharge:      EPDS screening completed. I have completed education and provided available resources for postpartum depression to the patient. Patient has verbalized understanding of signs and symptoms to report and all questions answered.      Based on EPDS score of   Postpartum Depression: Medium Risk    Last EPDS Total Score: 6    Last EPDS Self Harm Result: Never    patient has a scheduled follow-up appointment in    1 week
1650- here for elective IOL. Patient is 38.6 weeks pregnant. Patient states positive fetal movement, denies contractions, vaginal bleeding, leaking fluids, headache or blurred vision. Consents signed and reviewed, patient placed on monitor. -Dr. Alvarado in to see patient, SVE done cervx /. POC discussed and will start cytotec at 0000 with PCN. Patient agrees with POC. Patient off monitor,  BPM.     -Bedside report to [facility], care turned over at this time.
1915: Bedside and Verbal shift change report given to ELSY Mukherjee (oncoming nurse) by Sekou Maciel RN (offgoing nurse). Report included the following information Nurse Handoff Report. 2335: Patient placed on monitor for cytotec administration. 0014: First dose of cytotec given. 26: Second cytotec dose given. 0715: Bedside and Verbal shift change report given to GINA Cruz RN (oncoming nurse) by ELSY Correa RN (offgoing nurse). Report included the following information Nurse Handoff Report and MAR.
Post-Partum Day Number 1 Progress Note    Patient doing well post-partum without significant complaint. Voiding withour difficulty, normal lochia. Vitals:  Patient Vitals for the past 8 hrs:   BP Temp Pulse Resp SpO2   23 0805 112/87 98.9 °F (37.2 °C) 87 16 96 %     Temp (24hrs), Av.6 °F (37 °C), Min:97.9 °F (36.6 °C), Max:99.1 °F (37.3 °C)      Vital signs stable, afebrile. Exam:  Patient without distress. Abdomen soft, fundus firm at level of umbilicus, nontender               Perineum with normal lochia noted. Lower extremities are negative for swelling, cords or tenderness.     Lab/Data Review:  CBC:   Lab Results   Component Value Date/Time    WBC 16.0 2023 05:15 PM    RBC 3.75 2023 05:15 PM    HGB 11.2 2023 05:15 PM    HCT 34.3 2023 05:15 PM    MCV 91.5 2023 05:15 PM    MCH 29.9 2023 05:15 PM    MCHC 32.7 2023 05:15 PM    RDW 13.7 2023 05:15 PM     2023 05:15 PM    MPV 9.3 2023 05:15 PM     CBC with Differential:    Lab Results   Component Value Date/Time    WBC 16.0 2023 05:15 PM    RBC 3.75 2023 05:15 PM    HGB 11.2 2023 05:15 PM    HCT 34.3 2023 05:15 PM     2023 05:15 PM    MCV 91.5 2023 05:15 PM    MCH 29.9 2023 05:15 PM    MCHC 32.7 2023 05:15 PM    RDW 13.7 2023 05:15 PM    NRBC 0.0 2023 05:15 PM    NRBC 0.00 2023 05:15 PM    LYMPHOPCT 13 2023 05:15 PM    MONOPCT 8 2023 05:15 PM    BASOPCT 0 2023 05:15 PM    MONOSABS 1.2 2023 05:15 PM    LYMPHSABS 2.1 2023 05:15 PM    EOSABS 0.1 2023 05:15 PM    BASOSABS 0.0 2023 05:15 PM    DIFFTYPE AUTOMATED 2023 05:15 PM     CMP:    Lab Results   Component Value Date/Time     2022 03:53 PM    K 3.5 2022 03:53 PM     2022 03:53 PM    CO2 25 2022 03:53 PM    BUN 6 2022 03:53 PM
This RN agrees with Hussein Rose RN Charting
Karine Johnston RN (oncoming nurse) by Bettina Collins. Viktoria Wilde RN and Jacqueline Malik RN (offgoing nurse). Report included the following information Nurse Handoff Report, Intake/Output, MAR, and Recent Results.

## 2023-06-24 NOTE — DISCHARGE INSTRUCTIONS
medical care if:    You have signs of hemorrhage (too much bleeding), such as:  Heavy vaginal bleeding. This means that you are soaking through one or more pads in an hour. Or you pass blood clots bigger than an egg. Feeling dizzy or lightheaded, or you feel like you may faint. Feeling so tired or weak that you cannot do your usual activities. A fast or irregular heartbeat. New or worse belly pain. You have signs of infection, such as:  A fever. Vaginal discharge that smells bad. New or worse belly pain. You have symptoms of a blood clot in your leg (called a deep vein thrombosis), such as:  Pain in the calf, back of the knee, thigh, or groin. Redness and swelling in your leg or groin. You have signs of preeclampsia, such as:  Sudden swelling of your face, hands, or feet. New vision problems (such as dimness, blurring, or seeing spots). A severe headache. Watch closely for changes in your health, and be sure to contact your doctor if:    Your vaginal bleeding isn't decreasing. You feel sad, anxious, or hopeless for more than a few days. You are having problems with your breasts or breastfeeding. Where can you learn more? Go to http://www.woods.com/ and enter Z768 to learn more about \"Postpartum: Care Instructions. \"  Current as of: November 9, 2022               Content Version: 13.7  © 9144-7937 [employer]. Care instructions adapted under license by [facility] If you have questions about a medical condition or this instruction, always ask your healthcare professional. [place] [address] any warranty or liability for your use of this information.

## 2023-07-17 ENCOUNTER — TELEPHONE (OUTPATIENT)
Facility: HOSPITAL | Age: 23
End: 2023-07-17

## 2023-07-17 NOTE — TELEPHONE ENCOUNTER
Mother contacted for follow-up breastfeeding check. Mother states that the latch is good and baby is exclusively breastfeeding baby one breast and pumping to other breast. States she has a full supply and storing many ounces in the freezer. She is very happy since she did not have enough breast with her 1st baby. Mother provided with 's contact information if assistance is needed in the future.